# Patient Record
Sex: FEMALE | Race: WHITE | NOT HISPANIC OR LATINO | Employment: FULL TIME | URBAN - METROPOLITAN AREA
[De-identification: names, ages, dates, MRNs, and addresses within clinical notes are randomized per-mention and may not be internally consistent; named-entity substitution may affect disease eponyms.]

---

## 2017-05-25 ENCOUNTER — OFFICE VISIT (OUTPATIENT)
Dept: URGENT CARE | Facility: CLINIC | Age: 27
End: 2017-05-25
Payer: COMMERCIAL

## 2017-05-25 ENCOUNTER — APPOINTMENT (OUTPATIENT)
Dept: RADIOLOGY | Facility: IMAGING CENTER | Age: 27
End: 2017-05-25
Attending: PHYSICIAN ASSISTANT
Payer: COMMERCIAL

## 2017-05-25 VITALS
HEART RATE: 83 BPM | OXYGEN SATURATION: 99 % | RESPIRATION RATE: 18 BRPM | TEMPERATURE: 98.4 F | HEIGHT: 66 IN | WEIGHT: 140.2 LBS | BODY MASS INDEX: 22.53 KG/M2 | DIASTOLIC BLOOD PRESSURE: 82 MMHG | SYSTOLIC BLOOD PRESSURE: 124 MMHG

## 2017-05-25 DIAGNOSIS — R07.89 CHEST TIGHTNESS OR PRESSURE: ICD-10-CM

## 2017-05-25 DIAGNOSIS — F41.9 ANXIETY: ICD-10-CM

## 2017-05-25 PROCEDURE — 99204 OFFICE O/P NEW MOD 45 MIN: CPT | Performed by: PHYSICIAN ASSISTANT

## 2017-05-25 PROCEDURE — 71020 DX-CHEST-2 VIEWS: CPT | Mod: TC | Performed by: PHYSICIAN ASSISTANT

## 2017-05-25 PROCEDURE — 93000 ELECTROCARDIOGRAM COMPLETE: CPT | Performed by: PHYSICIAN ASSISTANT

## 2017-05-25 RX ORDER — NORGESTIMATE AND ETHINYL ESTRADIOL 0.25-0.035
1 KIT ORAL DAILY
Status: ON HOLD | COMMUNITY
End: 2021-09-28

## 2017-05-25 RX ORDER — ALBUTEROL SULFATE 90 UG/1
2 AEROSOL, METERED RESPIRATORY (INHALATION) EVERY 6 HOURS PRN
Qty: 8.5 G | Refills: 0 | Status: SHIPPED | OUTPATIENT
Start: 2017-05-25 | End: 2018-12-12

## 2017-05-25 NOTE — PROGRESS NOTES
"Subjective:      Katja Edwards is a 26 y.o. female who presents with Other    Pt PMH, SocHx, SurgHx, FamHx, Drug allergies and medications reviewed with pt/EPIC.      Family history reviewed, it is not pertinent to this complaint.      HPI Comments: Patient presents with:  Other: x 2 months has been having a tight chest, R side pressure that is worse with activity, had asthma as a child, comes and goes     PT denies recent travel, smoking, LE swelling, hx or family hx of PE/DVT.  PT is on OCP.      Other  This is a new problem. The current episode started more than 1 month ago. The problem occurs intermittently. The problem has been waxing and waning. Associated symptoms include chest pain (chest tightness). Pertinent negatives include no congestion, coughing, diaphoresis, fever, myalgias, nausea, neck pain, rash or vomiting. Exacerbated by: stress. She has tried rest, position changes and NSAIDs for the symptoms. The treatment provided no relief.       Review of Systems   Constitutional: Negative for fever and diaphoresis.   HENT: Negative for congestion.    Respiratory: Positive for shortness of breath (at times usually while at work). Negative for cough.    Cardiovascular: Positive for chest pain (chest tightness). Negative for palpitations.   Gastrointestinal: Negative for nausea and vomiting.   Musculoskeletal: Negative for myalgias and neck pain.   Skin: Negative for rash.   Neurological: Negative for dizziness.   Psychiatric/Behavioral: Negative for depression and substance abuse. The patient is nervous/anxious and has insomnia.    All other systems reviewed and are negative.         Objective:     /82 mmHg  Pulse 83  Temp(Src) 36.9 °C (98.4 °F)  Resp 18  Ht 1.676 m (5' 6\")  Wt 63.594 kg (140 lb 3.2 oz)  BMI 22.64 kg/m2  SpO2 99%     Physical Exam   Constitutional: She is oriented to person, place, and time. She appears well-developed and well-nourished. No distress.   HENT:   Head: " Normocephalic.   Nose: Nose normal.   Mouth/Throat: Oropharynx is clear and moist.   Eyes: Conjunctivae and EOM are normal. Pupils are equal, round, and reactive to light.   Neck: Normal range of motion. Neck supple.   Cardiovascular: Normal rate, regular rhythm, normal heart sounds and intact distal pulses.    Pulmonary/Chest: Effort normal and breath sounds normal.   Abdominal: Soft. Bowel sounds are normal. She exhibits no mass. There is no tenderness.   Musculoskeletal: Normal range of motion.   Neurological: She is alert and oriented to person, place, and time.   Skin: Skin is warm and dry.   Psychiatric: She has a normal mood and affect. Her speech is normal and behavior is normal. Thought content normal. Cognition and memory are normal.   Appears mildly anxious at times, though generally relaxed.    Nursing note and vitals reviewed.              FINDINGS:  The heart is normal in size.  No pulmonary infiltrates or consolidations are noted.  No pleural effusions are appreciated.  No evidence of pneumothorax or rib fracture.         Impression        No active disease.         Reading Provider Reading Date     Johann Thomas M.D. May 25, 2017         Signing Provider Signing Date Signing Time     Johann Thomas M.D. May 25, 2017 11:32 AM     EKG Interpretation   Interpreted by me   Rhythm: normal sinus   Rate: normal   Axis: normal   Ectopy: none   Conduction: normal   ST Segments: no acute change   T Waves: no acute change   Q Waves: none   Clinical Impression: no acute changes and normal EKG    Assessment/Plan:   Wells criteria (0 points for this patient) gives pt very minimal risk for PE.  I discussed this with the patient, she agrees and declined bloodwork/CT.     I doubt cardiac etiology of this chest pressure with normal EKG , lack of cardiac history and young age.  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  Pt states she has  noticed onset of today's symptoms shortly after beginning this new job.  PT denies feeling overly stressed but does acknowledge that this is a possibility.      We discussed keeping a journal, relaxation strategies, etc.      PT should follow up with PCP in 1-2 days for re-evaluation if symptoms have not improved.  Discussed red flags and reasons to return to UC or ED.  Pt and/or family verbalized understanding of diagnosis and follow up instructions and was given informational handout on diagnosis.  PT discharged.     Pt did request an albuterol inhaler , she has a very remote hx of exercise induced asthma and wondered if this might help her sob/tightness.  Rx provided.

## 2017-05-25 NOTE — PATIENT INSTRUCTIONS
Chest Pain, Nonspecific  It is often hard to give a specific diagnosis for the cause of chest pain. There is always a chance that your pain could be related to something serious, like a heart attack or a blood clot in the lungs. You need to follow up with your caregiver for further evaluation. More lab tests or other studies such as X-rays, electrocardiography, stress testing, or cardiac imaging may be needed to find the cause of your pain.  Most of the time, nonspecific chest pain improves within 2 to 3 days with rest and mild pain medicine. For the next few days, avoid physical exertion or activities that bring on pain. Do not smoke. Avoid drinking alcohol. Call your caregiver for routine follow-up as advised.   SEEK IMMEDIATE MEDICAL CARE IF:  · You develop increased chest pain or pain that radiates to the arm, neck, jaw, back, or abdomen.   · You develop shortness of breath, increased coughing, or you start coughing up blood.   · You have severe back or abdominal pain, nausea, or vomiting.   · You develop severe weakness, fainting, fever, or chills.   Document Released: 12/18/2006 Document Revised: 03/11/2013 Document Reviewed: 06/06/2008  [employer]® Patient Information ©2013 [employer].

## 2017-05-25 NOTE — Clinical Note
May 25, 2017         Patient: Katja Edwards   YOB: 1990   Date of Visit: 5/25/2017           To Whom it May Concern:    Katja Edwards was seen in my clinic on 5/25/2017. She may return to work on 05/26/2017.    If you have any questions or concerns, please don't hesitate to call.        Sincerely,           Darlin Guevara PA-C  Electronically Signed

## 2017-05-25 NOTE — MR AVS SNAPSHOT
"Katja Edwards   2017 10:45 AM   Office Visit   MRN: [id number]    Department:  [facility]   Dept Phone:  [phone]    Description:  Female : 1990   Provider:  Darlin Guevara PA-C           Reason for Visit     Other x 2 months has been having a tight chest, R side pressure that is worth with activity, had asthma as a child, comes and goes       Allergies as of 2017     Allergen Noted Reactions    Keflex 2017   Hives    Full body rash and hives     Pcn [Penicillins] 2017   Unspecified    As a baby       You were diagnosed with     Chest tightness or pressure   [785240]         Vital Signs     Blood Pressure Pulse Temperature Respirations Height Weight    124/82 mmHg 83 36.9 °C (98.4 °F) 18 1.676 m (5' 6\") 63.594 kg (140 lb 3.2 oz)    Body Mass Index Oxygen Saturation                22.64 kg/m2 99%          Basic Information     Date Of Birth Sex Race Ethnicity Preferred Language    1990 Female White Non- English      Health Maintenance     Patient has no pending health maintenance at this time      Current Immunizations     No immunizations on file.      Below and/or attached are the medications your provider expects you to take. Review all of your home medications and newly ordered medications with your provider and/or pharmacist. Follow medication instructions as directed by your provider and/or pharmacist. Please keep your medication list with you and share with your provider. Update the information when medications are discontinued, doses are changed, or new medications (including over-the-counter products) are added; and carry medication information at all times in the event of emergency situations     Allergies:  KEFLEX - Hives     PCN - Unspecified               Medications  Valid as of: May 25, 2017 - 11:58 AM    Generic Name Brand Name Tablet Size Instructions for use    Albuterol Sulfate (Aero Soln) albuterol 108 (90 BASE) MCG/ACT Inhale 2 " Puffs by mouth every 6 hours as needed for Shortness of Breath.        Norgestimate-Eth Estradiol (Tab) ORTHO-CYCLEN 0.25-35 MG-MCG Take 1 Tab by mouth every day.        .                 Medicines prescribed today were sent to:     [id number] - [place] - [address]    [address] [place] [address]    Phone: [phone] Fax: [phone]    Open 24 Hours?: No      Medication refill instructions:       If your prescription bottle indicates you have medication refills left, it is not necessary to call your provider’s office. Please contact your pharmacy and they will refill your medication.    If your prescription bottle indicates you do not have any refills left, you may request refills at any time through one of the following ways: The online [employer] system (except Urgent Care), by calling your provider’s office, or by asking your pharmacy to contact your provider’s office with a refill request. Medication refills are processed only during regular business hours and may not be available until the next business day. Your provider may request additional information or to have a follow-up visit with you prior to refilling your medication.   *Please Note: Medication refills are assigned a new Rx number when refilled electronically. Your pharmacy may indicate that no refills were authorized even though a new prescription for the same medication is available at the pharmacy. Please request the medicine by name with the pharmacy before contacting your provider for a refill.        Your To Do List     Future Labs/Procedures Complete By Expires    DX-CHEST-2 VIEWS  As directed 5/25/2018      Instructions    Chest Pain, Nonspecific  It is often hard to give a specific diagnosis for the cause of chest pain. There is always a chance that your pain could be related to something serious, like a heart attack or a blood clot in the lungs. You need to follow up with your caregiver for further evaluation. More lab tests or  other studies such as X-rays, electrocardiography, stress testing, or cardiac imaging may be needed to find the cause of your pain.  Most of the time, nonspecific chest pain improves within 2 to 3 days with rest and mild pain medicine. For the next few days, avoid physical exertion or activities that bring on pain. Do not smoke. Avoid drinking alcohol. Call your caregiver for routine follow-up as advised.   SEEK IMMEDIATE MEDICAL CARE IF:  · You develop increased chest pain or pain that radiates to the arm, neck, jaw, back, or abdomen.   · You develop shortness of breath, increased coughing, or you start coughing up blood.   · You have severe back or abdominal pain, nausea, or vomiting.   · You develop severe weakness, fainting, fever, or chills.   Document Released: 12/18/2006 Document Revised: 03/11/2013 Document Reviewed: 06/06/2008  e(ye)BRAINCare® Patient Information ©2013 [employer].          [employer] Access Code: [email or web address]  Expires: 6/24/2017 11:58 AM    [employer]  A secure, online tool to manage your health information     [employer]s [employer]® is a secure, online tool that connects you to your personalized health information from the privacy of your home -- day or night - making it very easy for you to manage your healthcare. Once the activation process is completed, you can even access your medical information using the [employer] akanksha, which is available for free in the Apple Akanksha store or Google Play store.     [employer] provides the following levels of access (as shown below):   My Chart Features   [facility]  Specialists [facility]  Urgent  Care Non-[facility]  Primary Care  Doctor   Email your healthcare team securely and privately 24/7 X X X    Manage appointments: schedule your next appointment; view details of past/upcoming appointments X      Request prescription refills. X      View recent personal medical records, including lab and immunizations X X X X   View health record,  including health history, allergies, medications X X X X   Read reports about your outpatient visits, procedures, consult and ER notes X X X X   See your discharge summary, which is a recap of your hospital and/or ER visit that includes your diagnosis, lab results, and care plan. X X       How to register for [employer]:  1. Go to  [email or web address].  2. Click on the Sign Up Now box, which takes you to the New Member Sign Up page. You will need to provide the following information:  a. Enter your [employer] Access Code exactly as it appears at the top of this page. (You will not need to use this code after you’ve completed the sign-up process. If you do not sign up before the expiration date, you must request a new code.)   b. Enter your date of birth.   c. Enter your home email address.   d. Click Submit, and follow the next screen’s instructions.  3. Create a [employer] ID. This will be your [employer] login ID and cannot be changed, so think of one that is secure and easy to remember.  4. Create a [employer] password. You can change your password at any time.  5. Enter your Password Reset Question and Answer. This can be used at a later time if you forget your password.   6. Enter your e-mail address. This allows you to receive e-mail notifications when new information is available in [employer].  7. Click Sign Up. You can now view your health information.    For assistance activating your [employer] account, call [phone]

## 2017-05-26 ASSESSMENT — ENCOUNTER SYMPTOMS
NERVOUS/ANXIOUS: 1
DIZZINESS: 0
VOMITING: 0
COUGH: 0
INSOMNIA: 1
SHORTNESS OF BREATH: 1
PALPITATIONS: 0
NECK PAIN: 0
DEPRESSION: 0
DIAPHORESIS: 0
FEVER: 0
NAUSEA: 0
MYALGIAS: 0

## 2017-05-26 ASSESSMENT — LIFESTYLE VARIABLES: SUBSTANCE_ABUSE: 0

## 2018-02-06 ENCOUNTER — OFFICE VISIT (OUTPATIENT)
Dept: URGENT CARE | Facility: CLINIC | Age: 28
End: 2018-02-06
Payer: COMMERCIAL

## 2018-02-06 VITALS
OXYGEN SATURATION: 96 % | TEMPERATURE: 99.9 F | BODY MASS INDEX: 22.18 KG/M2 | WEIGHT: 138 LBS | RESPIRATION RATE: 20 BRPM | HEIGHT: 66 IN | SYSTOLIC BLOOD PRESSURE: 110 MMHG | DIASTOLIC BLOOD PRESSURE: 70 MMHG | HEART RATE: 100 BPM

## 2018-02-06 DIAGNOSIS — R68.89 FLU-LIKE SYMPTOMS: ICD-10-CM

## 2018-02-06 DIAGNOSIS — Z20.828 EXPOSURE TO THE FLU: ICD-10-CM

## 2018-02-06 LAB
FLUAV+FLUBV AG SPEC QL IA: NORMAL
INT CON NEG: NEGATIVE
INT CON POS: POSITIVE

## 2018-02-06 PROCEDURE — 87804 INFLUENZA ASSAY W/OPTIC: CPT | Performed by: NURSE PRACTITIONER

## 2018-02-06 PROCEDURE — 99214 OFFICE O/P EST MOD 30 MIN: CPT | Performed by: NURSE PRACTITIONER

## 2018-02-06 RX ORDER — OSELTAMIVIR PHOSPHATE 75 MG/1
75 CAPSULE ORAL 2 TIMES DAILY
Qty: 10 CAP | Refills: 0 | Status: SHIPPED | OUTPATIENT
Start: 2018-02-06 | End: 2018-12-12

## 2018-02-06 ASSESSMENT — ENCOUNTER SYMPTOMS
SORE THROAT: 1
COUGH: 1
FEVER: 0

## 2018-02-06 ASSESSMENT — PATIENT HEALTH QUESTIONNAIRE - PHQ9: CLINICAL INTERPRETATION OF PHQ2 SCORE: 0

## 2018-02-07 NOTE — PROGRESS NOTES
"Subjective:      Katja Edwards is a 27 y.o. female who presents with Headache (Since yesterday fever and headache)            This is a new problem. Pt admits she was with a friend all weekend that tested positive for the flu yesterday morning. Pt reports onset of flu like symptoms that began last night. She is worried she may be coming down with the flu. Denies fever. + ST and cough.        Review of Systems   Constitutional: Negative for fever.   HENT: Positive for sore throat.    Respiratory: Positive for cough.    All other systems reviewed and are negative.    No past medical history on file. No past surgical history on file.   Social History     Social History   • Marital status: Single     Spouse name: N/A   • Number of children: N/A   • Years of education: N/A     Occupational History   • Not on file.     Social History Main Topics   • Smoking status: Never Smoker   • Smokeless tobacco: Never Used   • Alcohol use Not on file   • Drug use: Unknown   • Sexual activity: Not on file     Other Topics Concern   • Not on file     Social History Narrative   • No narrative on file          Objective:     /70   Pulse 100   Temp 37.7 °C (99.9 °F)   Resp 20   Ht 1.676 m (5' 6\")   Wt 62.6 kg (138 lb)   SpO2 96%   BMI 22.27 kg/m²      Physical Exam   Constitutional: She is oriented to person, place, and time. Vital signs are normal. She appears well-developed and well-nourished.   HENT:   Head: Normocephalic and atraumatic.   Right Ear: Tympanic membrane and external ear normal.   Left Ear: Tympanic membrane and external ear normal.   Nose: Nose normal.   Mouth/Throat: Posterior oropharyngeal erythema present.   Eyes: EOM are normal. Pupils are equal, round, and reactive to light.   Neck: Normal range of motion.   Cardiovascular: Normal rate and regular rhythm.    Pulmonary/Chest: Effort normal and breath sounds normal.   Musculoskeletal: Normal range of motion.   Lymphadenopathy:        Head (right side): " Submandibular adenopathy present.        Head (left side): Submandibular adenopathy present.   Neurological: She is alert and oriented to person, place, and time.   Skin: Skin is warm and dry. Capillary refill takes less than 2 seconds.   Psychiatric: She has a normal mood and affect. Her speech is normal and behavior is normal. Thought content normal.   Vitals reviewed.              Assessment/Plan:     1. Flu-like symptoms  - POCT Influenza A/B NEGATIVE  - oseltamivir (TAMIFLU) 75 MG Cap; Take 1 Cap by mouth 2 times a day.  Dispense: 10 Cap; Refill: 0    2. Exposure to the flu  - oseltamivir (TAMIFLU) 75 MG Cap; Take 1 Cap by mouth 2 times a day.  Dispense: 10 Cap; Refill: 0    Discussed with patient remains likely viral at this time, may be the flu  Will give contingent antiviral Rx  Increase water intake  Plenty of rest   Tylenol and Ibuprofen PRN pain/aches  Supportive care, differential diagnoses, and indications for immediate follow-up discussed with patient.    Pathogenesis of diagnosis discussed including typical length and natural progression.      Instructed to return to  or nearest emergency department if symptoms fail to improve, for any change in condition, further concerns, or new concerning symptoms.  Patient states understanding of the plan of care and discharge instructions.

## 2018-10-23 ENCOUNTER — OFFICE VISIT (OUTPATIENT)
Dept: URGENT CARE | Facility: PHYSICIAN GROUP | Age: 28
End: 2018-10-23
Payer: COMMERCIAL

## 2018-10-23 ENCOUNTER — HOSPITAL ENCOUNTER (OUTPATIENT)
Dept: RADIOLOGY | Facility: MEDICAL CENTER | Age: 28
End: 2018-10-23
Attending: PHYSICIAN ASSISTANT
Payer: COMMERCIAL

## 2018-10-23 VITALS
TEMPERATURE: 97.6 F | SYSTOLIC BLOOD PRESSURE: 112 MMHG | DIASTOLIC BLOOD PRESSURE: 68 MMHG | WEIGHT: 142 LBS | BODY MASS INDEX: 22.92 KG/M2 | HEART RATE: 62 BPM | OXYGEN SATURATION: 98 %

## 2018-10-23 DIAGNOSIS — S96.912A MUSCLE STRAIN OF LEFT FOOT, INITIAL ENCOUNTER: ICD-10-CM

## 2018-10-23 DIAGNOSIS — M77.42 METATARSALGIA OF LEFT FOOT: ICD-10-CM

## 2018-10-23 PROCEDURE — 99213 OFFICE O/P EST LOW 20 MIN: CPT | Performed by: PHYSICIAN ASSISTANT

## 2018-10-23 PROCEDURE — 73630 X-RAY EXAM OF FOOT: CPT | Mod: LT

## 2018-10-23 ASSESSMENT — ENCOUNTER SYMPTOMS
VOMITING: 0
SHORTNESS OF BREATH: 0
SENSORY CHANGE: 0
FEVER: 0
CHILLS: 0
NAUSEA: 0
TINGLING: 0

## 2018-10-23 NOTE — PROGRESS NOTES
"Subjective:   Katja Edwards is a 27 y.o. female who presents for Foot Pain (x 8 days, left foot)        Other   This is a new problem. The current episode started in the past 7 days. Pertinent negatives include no chills, fever, nausea, rash or vomiting.     Patient complains of pain beneath the ball of left foot.  She states pain is both the bottom as well as top of foot.  She denies traumatic injury she states she awoke with this discomfort last Sunday morning.  She denies trauma or injury prior.  She states she did walk her dog the day before but does not recall injury.  She denies past medical history of surgery or similar pains to this foot.  She states she wears \"business flats\" while at work denies regular use of high heeled shoes.  She is tried nothing for treatment complains of mild pain with weightbearing.    Review of Systems   Constitutional: Negative for chills and fever.   Respiratory: Negative for shortness of breath.    Gastrointestinal: Negative for nausea and vomiting.   Musculoskeletal: Positive for joint pain ( pos for left foot pain).   Skin: Negative for rash.   Neurological: Negative for tingling and sensory change.     Allergies   Allergen Reactions   • Keflex Hives     Full body rash and hives    • Pcn [Penicillins] Unspecified     As a baby       Objective:   /68 (BP Location: Right arm, Patient Position: Sitting)   Pulse 62   Temp 36.4 °C (97.6 °F) (Temporal)   Wt 64.4 kg (142 lb)   LMP 10/21/2018 (Exact Date)   SpO2 98%   BMI 22.92 kg/m²   Physical Exam   Constitutional: She is oriented to person, place, and time. She appears well-developed and well-nourished. No distress.   HENT:   Head: Normocephalic and atraumatic.   Right Ear: External ear normal.   Left Ear: External ear normal.   Nose: Nose normal.   Eyes: Conjunctivae and lids are normal. Right eye exhibits no discharge. Left eye exhibits no discharge. No scleral icterus.   Neck: Neck supple.   Pulmonary/Chest: Effort " normal. No respiratory distress.   Musculoskeletal: Normal range of motion.        Left foot: There is tenderness.        Feet:    Mild tenderness to palpation elicited in the primarily pain in the plantar aspect of second through fourth metatarsal heads no focal tenderness to palpation difficult to discern interdigital versus bony pain   Neurological: She is alert and oriented to person, place, and time. She has normal strength. She is not disoriented. No sensory deficit. Coordination and gait normal.   Skin: Skin is warm and dry. She is not diaphoretic. No erythema. No pallor.   Psychiatric: Her speech is normal and behavior is normal.   Nursing note and vitals reviewed.        Assessment/Plan:   Assessment    1. Muscle strain of left foot, initial encounter  - DX-FOOT-COMPLETE 3+ LEFT; Future    2. Metatarsalgia of left foot  - REFERRAL TO PODIATRY  Recommend conservative care, rest, ice, elevation, supportive shoes, work on gentle ROM exercises, sent w/ handout on metatarsal pads  over-the-counter anti-inflammatories follow-up with podiatry with lack of resolution  Return to clinic with lack of resolution or progression of symptoms.    Differential diagnosis, natural history, supportive care, and indications for immediate follow-up discussed.

## 2018-12-12 ENCOUNTER — OFFICE VISIT (OUTPATIENT)
Dept: URGENT CARE | Facility: PHYSICIAN GROUP | Age: 28
End: 2018-12-12
Payer: COMMERCIAL

## 2018-12-12 VITALS
WEIGHT: 142 LBS | DIASTOLIC BLOOD PRESSURE: 68 MMHG | HEART RATE: 71 BPM | HEIGHT: 66 IN | TEMPERATURE: 97.9 F | RESPIRATION RATE: 12 BRPM | OXYGEN SATURATION: 98 % | SYSTOLIC BLOOD PRESSURE: 108 MMHG | BODY MASS INDEX: 22.82 KG/M2

## 2018-12-12 DIAGNOSIS — J22 ACUTE LOWER RESPIRATORY TRACT INFECTION: ICD-10-CM

## 2018-12-12 DIAGNOSIS — R05.9 COUGH: ICD-10-CM

## 2018-12-12 DIAGNOSIS — J02.9 SORE THROAT: ICD-10-CM

## 2018-12-12 PROCEDURE — 99214 OFFICE O/P EST MOD 30 MIN: CPT | Performed by: NURSE PRACTITIONER

## 2018-12-12 RX ORDER — AZITHROMYCIN 250 MG/1
TABLET, FILM COATED ORAL
Qty: 6 TAB | Refills: 0 | Status: ON HOLD | OUTPATIENT
Start: 2018-12-12 | End: 2021-09-28

## 2018-12-12 RX ORDER — CODEINE PHOSPHATE AND GUAIFENESIN 10; 100 MG/5ML; MG/5ML
5 SOLUTION ORAL
Qty: 120 ML | Refills: 0 | Status: SHIPPED | OUTPATIENT
Start: 2018-12-12 | End: 2019-01-01

## 2018-12-13 NOTE — NON-PROVIDER
Chief Complaint   Patient presents with   • Cough     Congestion, chest congestion  [id number]       HISTORY OF PRESENT ILLNESS: Patient is a 27 y.o. female who presents to urgent care today for complaints of chest congestion and cough that started 6 weeks ago. She states that her symptoms started initially 10/29 with a sore throat. She went to [facility] where she was tested for strep that was negative. She continues to have a sore throat off and on but thinks its due to her frequent coughing. Her chest congestion and cough have continued, she has tired OTC cough medications and Mucinex without relief of her symptoms. She has not had any antibiotics for her symptoms. Her cough is productive, yellow in color, and does keep her awake at night. Recent sick contacts include her immunocompromised nephew who has similar symptoms who's pediatrician believes it to be viral. Denies fever, body aches, ear pain, shortness of breath, wheezing, chest pain, nausea, vomiting, diarrhea or abdominal pain. Reports she had asthma as a child but no issues with asthma in 18 years, does not use an inhaler.       There are no active problems to display for this patient.      Allergies:Keflex and Pcn [penicillins]    Current Outpatient Prescriptions Ordered in [facility]   Medication Sig Dispense Refill   • azithromycin (ZITHROMAX) 250 MG Tab Take two tabs on day one followed by one tab on days 2-5. 6 Tab 0   • guaifenesin-codeine (ROBITUSSIN AC) Solution oral solution Take 5 mL by mouth at bedtime as needed for up to 20 days. Do not drive, work, drink alcohol or engaged in potentially hazardous activity while taking this medication. 120 mL 0   • norgestimate-ethinyl estradiol (ORTHO-CYCLEN) 0.25-35 MG-MCG per tablet Take 1 Tab by mouth every day.       No current [facility]-ordered facility-administered medications on file.        History reviewed. No pertinent past medical history.    Social History   Substance Use Topics   • Smoking status: Never  "Smoker   • Smokeless tobacco: Never Used   • Alcohol use Yes      Comment: occ       No family status information on file.   History reviewed. No pertinent family history.    ROS:  Review of Systems   Constitutional: Negative for fever, chills, weight loss, malaise, and fatigue.   HENT: Negative for ear pain, nosebleeds, nasal congestion, and neck pain. Positive for sore throat.   Eyes: Negative for vision changes.   Neuro: Negative for headache, sensory changes, weakness, seizure, LOC.   Cardiovascular: Negative for chest pain, palpitations, orthopnea and leg swelling.   Respiratory: Negative for shortness of breath and wheezing.  Positive for cough, sputum production, chest congestion.   Gastrointestinal: Negative for abdominal pain, nausea, vomiting or diarrhea.   Musculoskeletal: Negative for falls, neck pain, back pain, joint pain, myalgias.   Skin: Negative for rash, diaphoresis.     Exam:  Blood pressure 108/68, pulse 71, temperature 36.6 °C (97.9 °F), temperature source Temporal, resp. rate 12, height 1.676 m (5' 6\"), weight 64.4 kg (142 lb), SpO2 98 %, not currently breastfeeding.     General: well-nourished, well-developed female in NAD  Head: normocephalic, atraumatic  Eyes: PERRLA, no conjunctival injection, acuity grossly intact, lids normal.  Ears: normal shape and symmetry, no tenderness, no discharge. External canals are without any significant edema or erythema. Tympanic membranes are without any inflammation, no effusion. Gross auditory acuity is intact.  Nose: symmetrical without tenderness, no discharge.  Mouth/Throat: reasonable hygiene, no exudates or tonsillar enlargement. Erythema to posterior pharynx.  Neck: no masses, range of motion within normal limits, no tracheal deviation. No obvious thyroid enlargement.   Lymph: no cervical adenopathy. No supraclavicular adenopathy.   Neuro: alert and oriented. Cranial nerves 2-12 grossly intact. No sensory deficit.   Cardiovascular: regular rate and " rhythm. No edema.  Pulmonary: no distress. Chest is symmetrical with respiration, no wheezes, crackles, or rhonchi. Clear to auscultation in all lung fields.   Abdomen: soft, non-tender, no guarding, no hepatosplenomegaly.  Musculoskeletal: no clubbing, appropriate muscle tone, gait is stable.  Skin: warm, dry, intact, no clubbing, no cyanosis, no rashes.   Psych: appropriate mood, affect, judgement.         Assessment/Plan:  1. Acute lower respiratory tract infection  azithromycin (ZITHROMAX) 250 MG Tab   2. Sore throat     3. Cough  guaifenesin-codeine (ROBITUSSIN AC) Solution oral solution       Given patient's symptoms have continued for 6 weeks without improvement and has not tried antibiotics, patient in agreement with taking antibiotics. Discussed that if her symptoms do not improve in next 48-72 hours, or if she develops fever, chills, increased throat pain, nausea, or vomiting to return to clinic and perform additional tests including chest xray and repeat strep. Patient is in agreement with plan. She is non-toxic appearing. Robitussin AC sedative effects discussed and to take at night for cough.     Supportive care, differential diagnoses, and indications for immediate follow-up discussed with patient.   Pathogenesis of diagnosis discussed including typical length and natural progression.   Instructed to return to clinic or nearest emergency department for any change in condition, further concerns, or worsening of symptoms.  Patient states understanding of the plan of care and discharge instructions. Patient is discharged in stable condition.   Instructed to make an appointment, for follow up, with her primary care provider.      Daniella Pritchard RN, BSN, A.P.R.N. student   .

## 2018-12-13 NOTE — PROGRESS NOTES
Chart reviewed. I personally have evaluated patient and agree with FRANCISCA student, Daniella Pritchard's, evaluation, assessment and treatment plan. I have personally treated the patient and prescribed the medication needed for treatment today.       FRANCISCA Goldman

## 2020-02-24 ENCOUNTER — OFFICE VISIT (OUTPATIENT)
Dept: URGENT CARE | Facility: CLINIC | Age: 30
End: 2020-02-24
Payer: COMMERCIAL

## 2020-02-24 VITALS
DIASTOLIC BLOOD PRESSURE: 70 MMHG | TEMPERATURE: 98.4 F | BODY MASS INDEX: 24.91 KG/M2 | HEIGHT: 66 IN | WEIGHT: 155 LBS | OXYGEN SATURATION: 99 % | HEART RATE: 68 BPM | SYSTOLIC BLOOD PRESSURE: 112 MMHG | RESPIRATION RATE: 16 BRPM

## 2020-02-24 DIAGNOSIS — Z20.828 EXPOSURE TO INFLUENZA: ICD-10-CM

## 2020-02-24 LAB
FLUAV+FLUBV AG SPEC QL IA: NEGATIVE
INT CON NEG: NORMAL
INT CON POS: NORMAL

## 2020-02-24 PROCEDURE — 99214 OFFICE O/P EST MOD 30 MIN: CPT | Performed by: FAMILY MEDICINE

## 2020-02-24 PROCEDURE — 87804 INFLUENZA ASSAY W/OPTIC: CPT | Performed by: FAMILY MEDICINE

## 2020-02-24 RX ORDER — OSELTAMIVIR PHOSPHATE 75 MG/1
75 CAPSULE ORAL 2 TIMES DAILY
Qty: 10 CAP | Refills: 0 | Status: ON HOLD | OUTPATIENT
Start: 2020-02-24 | End: 2021-09-28

## 2020-02-24 ASSESSMENT — ENCOUNTER SYMPTOMS
SORE THROAT: 1
SINUS PAIN: 0
COUGH: 1
RHINORRHEA: 1

## 2020-02-24 NOTE — PATIENT INSTRUCTIONS

## 2020-02-24 NOTE — PROGRESS NOTES
"Subjective:   Katja Edwards  is a 29 y.o. female who presents for Nasal Congestion (Flu expossure, today stuffy  nose .)        URI    This is a new problem. The current episode started today. The problem has been gradually worsening. There has been no fever. Associated symptoms include congestion, coughing, rhinorrhea and a sore throat. Pertinent negatives include no sinus pain.     Review of Systems   HENT: Positive for congestion, rhinorrhea and sore throat. Negative for sinus pain.    Respiratory: Positive for cough.      Allergies   Allergen Reactions   • Keflex Hives     Full body rash and hives    • Pcn [Penicillins] Unspecified     As a baby       Objective:   /70   Pulse 68   Temp 36.9 °C (98.4 °F) (Temporal)   Resp 16   Ht 1.676 m (5' 6\")   Wt 70.3 kg (155 lb)   SpO2 99%   BMI 25.02 kg/m²   Physical Exam  Constitutional:       General: She is not in acute distress.     Appearance: She is well-developed.   HENT:      Head: Normocephalic and atraumatic.      Mouth/Throat:      Pharynx: Uvula midline. Posterior oropharyngeal erythema present.      Tonsils: No tonsillar abscesses.   Eyes:      Conjunctiva/sclera: Conjunctivae normal.      Pupils: Pupils are equal, round, and reactive to light.   Cardiovascular:      Rate and Rhythm: Normal rate and regular rhythm.      Heart sounds: No murmur.   Pulmonary:      Effort: Pulmonary effort is normal. No respiratory distress.      Breath sounds: Normal breath sounds.   Abdominal:      General: There is no distension.      Palpations: Abdomen is soft.      Tenderness: There is no abdominal tenderness.   Skin:     General: Skin is warm and dry.   Neurological:      Mental Status: She is alert and oriented to person, place, and time.      Sensory: No sensory deficit.      Deep Tendon Reflexes: Reflexes are normal and symmetric.           Assessment/Plan:   1. Exposure to influenza  - oseltamivir (TAMIFLU) 75 MG Cap; Take 1 Cap by mouth 2 times a day.  " Dispense: 10 Cap; Refill: 0    Differential diagnosis, natural history, supportive care, and indications for immediate follow-up discussed.

## 2021-03-04 LAB
ABO GROUP BLD: NORMAL
HBV SURFACE AG SERPL QL IA: NEGATIVE
HIV 1+2 AB+HIV1 P24 AG SERPL QL IA: NONREACTIVE
RH BLD: NORMAL
RUBV IGG SERPL IA-ACNC: NORMAL
TREPONEMA PALLIDUM IGG+IGM AB [PRESENCE] IN SERUM OR PLASMA BY IMMUNOASSAY: NON REACTIVE

## 2021-09-02 LAB — GP B STREP DNA SPEC QL NAA+PROBE: NEGATIVE

## 2021-09-26 ENCOUNTER — HOSPITAL ENCOUNTER (EMERGENCY)
Facility: MEDICAL CENTER | Age: 31
End: 2021-09-26
Attending: OBSTETRICS & GYNECOLOGY | Admitting: OBSTETRICS & GYNECOLOGY
Payer: COMMERCIAL

## 2021-09-26 VITALS
HEART RATE: 76 BPM | RESPIRATION RATE: 18 BRPM | SYSTOLIC BLOOD PRESSURE: 120 MMHG | WEIGHT: 180 LBS | OXYGEN SATURATION: 94 % | HEIGHT: 66 IN | DIASTOLIC BLOOD PRESSURE: 78 MMHG | BODY MASS INDEX: 28.93 KG/M2 | TEMPERATURE: 97.9 F

## 2021-09-26 VITALS
WEIGHT: 180 LBS | DIASTOLIC BLOOD PRESSURE: 67 MMHG | SYSTOLIC BLOOD PRESSURE: 113 MMHG | OXYGEN SATURATION: 94 % | HEIGHT: 66 IN | TEMPERATURE: 97.1 F | BODY MASS INDEX: 28.93 KG/M2 | HEART RATE: 70 BPM

## 2021-09-26 PROCEDURE — 302449 STATCHG TRIAGE ONLY (STATISTIC)

## 2021-09-26 PROCEDURE — A9270 NON-COVERED ITEM OR SERVICE: HCPCS | Performed by: OBSTETRICS & GYNECOLOGY

## 2021-09-26 PROCEDURE — 59025 FETAL NON-STRESS TEST: CPT

## 2021-09-26 PROCEDURE — 700102 HCHG RX REV CODE 250 W/ 637 OVERRIDE(OP): Performed by: OBSTETRICS & GYNECOLOGY

## 2021-09-26 RX ORDER — ZOLPIDEM TARTRATE 5 MG/1
5 TABLET ORAL ONCE
Status: COMPLETED | OUTPATIENT
Start: 2021-09-26 | End: 2021-09-26

## 2021-09-26 RX ADMIN — ZOLPIDEM TARTRATE 5 MG: 5 TABLET ORAL at 20:21

## 2021-09-26 NOTE — PROGRESS NOTES
PT is a ; RACHEL of ; making her 39w3d. Pt here c/o painful Ucs that started yesterday and have continued through this morning about every 2-7 minutes. Denies LOF, VB and reports +FM. EFM and TOCO applied. VSS.   Reactive NST obtained. SVE at /-2.     SVE one hour later with no cervical change noted. Dr Valadez notified. Okay to offer pt Ambien if desired.     General discharge instructions, labor precautions and when to return discussed with pt and FOB. Pt feels comfortable discharging home. PT and FOB state they live 15 minutes or so away and are knowledgeable when to return. Pt encouraged to call/return with concerns/questions. Pt signed discharge instructions and ambulated out in stable condition with FOB at side.

## 2021-09-27 ENCOUNTER — ANESTHESIA (OUTPATIENT)
Dept: ANESTHESIOLOGY | Facility: MEDICAL CENTER | Age: 31
End: 2021-09-27
Payer: COMMERCIAL

## 2021-09-27 ENCOUNTER — ANESTHESIA EVENT (OUTPATIENT)
Dept: ANESTHESIOLOGY | Facility: MEDICAL CENTER | Age: 31
End: 2021-09-27
Payer: COMMERCIAL

## 2021-09-27 ENCOUNTER — HOSPITAL ENCOUNTER (INPATIENT)
Facility: MEDICAL CENTER | Age: 31
LOS: 1 days | End: 2021-09-28
Attending: OBSTETRICS & GYNECOLOGY | Admitting: OBSTETRICS & GYNECOLOGY
Payer: COMMERCIAL

## 2021-09-27 LAB
BASOPHILS # BLD AUTO: 0.3 % (ref 0–1.8)
BASOPHILS # BLD: 0.04 K/UL (ref 0–0.12)
EOSINOPHIL # BLD AUTO: 0.02 K/UL (ref 0–0.51)
EOSINOPHIL NFR BLD: 0.1 % (ref 0–6.9)
ERYTHROCYTE [DISTWIDTH] IN BLOOD BY AUTOMATED COUNT: 46.7 FL (ref 35.9–50)
HCT VFR BLD AUTO: 39.2 % (ref 37–47)
HGB BLD-MCNC: 13.5 G/DL (ref 12–16)
HOLDING TUBE BB 8507: NORMAL
IMM GRANULOCYTES # BLD AUTO: 0.07 K/UL (ref 0–0.11)
IMM GRANULOCYTES NFR BLD AUTO: 0.4 % (ref 0–0.9)
LYMPHOCYTES # BLD AUTO: 1.42 K/UL (ref 1–4.8)
LYMPHOCYTES NFR BLD: 9 % (ref 22–41)
MCH RBC QN AUTO: 31 PG (ref 27–33)
MCHC RBC AUTO-ENTMCNC: 34.4 G/DL (ref 33.6–35)
MCV RBC AUTO: 90.1 FL (ref 81.4–97.8)
MONOCYTES # BLD AUTO: 1.09 K/UL (ref 0–0.85)
MONOCYTES NFR BLD AUTO: 6.9 % (ref 0–13.4)
NEUTROPHILS # BLD AUTO: 13.07 K/UL (ref 2–7.15)
NEUTROPHILS NFR BLD: 83.3 % (ref 44–72)
NRBC # BLD AUTO: 0 K/UL
NRBC BLD-RTO: 0 /100 WBC
PLATELET # BLD AUTO: 283 K/UL (ref 164–446)
PMV BLD AUTO: 9.5 FL (ref 9–12.9)
RBC # BLD AUTO: 4.35 M/UL (ref 4.2–5.4)
SARS-COV+SARS-COV-2 AG RESP QL IA.RAPID: NOTDETECTED
SPECIMEN SOURCE: NORMAL
WBC # BLD AUTO: 15.7 K/UL (ref 4.8–10.8)

## 2021-09-27 PROCEDURE — 770002 HCHG ROOM/CARE - OB PRIVATE (112)

## 2021-09-27 PROCEDURE — 700105 HCHG RX REV CODE 258: Performed by: OBSTETRICS & GYNECOLOGY

## 2021-09-27 PROCEDURE — 87426 SARSCOV CORONAVIRUS AG IA: CPT

## 2021-09-27 PROCEDURE — 0HQ9XZZ REPAIR PERINEUM SKIN, EXTERNAL APPROACH: ICD-10-PCS | Performed by: OBSTETRICS & GYNECOLOGY

## 2021-09-27 PROCEDURE — A9270 NON-COVERED ITEM OR SERVICE: HCPCS | Performed by: OBSTETRICS & GYNECOLOGY

## 2021-09-27 PROCEDURE — 700102 HCHG RX REV CODE 250 W/ 637 OVERRIDE(OP): Performed by: OBSTETRICS & GYNECOLOGY

## 2021-09-27 PROCEDURE — 700111 HCHG RX REV CODE 636 W/ 250 OVERRIDE (IP)

## 2021-09-27 PROCEDURE — 85025 COMPLETE CBC W/AUTO DIFF WBC: CPT

## 2021-09-27 PROCEDURE — 700111 HCHG RX REV CODE 636 W/ 250 OVERRIDE (IP): Performed by: ANESTHESIOLOGY

## 2021-09-27 PROCEDURE — 10907ZC DRAINAGE OF AMNIOTIC FLUID, THERAPEUTIC FROM PRODUCTS OF CONCEPTION, VIA NATURAL OR ARTIFICIAL OPENING: ICD-10-PCS | Performed by: OBSTETRICS & GYNECOLOGY

## 2021-09-27 PROCEDURE — 59409 OBSTETRICAL CARE: CPT

## 2021-09-27 PROCEDURE — 700111 HCHG RX REV CODE 636 W/ 250 OVERRIDE (IP): Performed by: OBSTETRICS & GYNECOLOGY

## 2021-09-27 PROCEDURE — 303615 HCHG EPIDURAL/SPINAL ANESTHESIA FOR LABOR

## 2021-09-27 PROCEDURE — 304965 HCHG RECOVERY SERVICES

## 2021-09-27 PROCEDURE — 36415 COLL VENOUS BLD VENIPUNCTURE: CPT

## 2021-09-27 RX ORDER — SODIUM CHLORIDE, SODIUM LACTATE, POTASSIUM CHLORIDE, AND CALCIUM CHLORIDE .6; .31; .03; .02 G/100ML; G/100ML; G/100ML; G/100ML
250 INJECTION, SOLUTION INTRAVENOUS PRN
Status: DISCONTINUED | OUTPATIENT
Start: 2021-09-27 | End: 2021-09-27 | Stop reason: HOSPADM

## 2021-09-27 RX ORDER — BUPIVACAINE HYDROCHLORIDE 2.5 MG/ML
INJECTION, SOLUTION EPIDURAL; INFILTRATION; INTRACAUDAL
Status: COMPLETED
Start: 2021-09-27 | End: 2021-09-27

## 2021-09-27 RX ORDER — MISOPROSTOL 200 UG/1
600 TABLET ORAL
Status: DISCONTINUED | OUTPATIENT
Start: 2021-09-27 | End: 2021-09-28 | Stop reason: HOSPADM

## 2021-09-27 RX ORDER — ROPIVACAINE HYDROCHLORIDE 2 MG/ML
INJECTION, SOLUTION EPIDURAL; INFILTRATION; PERINEURAL CONTINUOUS
Status: DISCONTINUED | OUTPATIENT
Start: 2021-09-27 | End: 2021-09-28 | Stop reason: HOSPADM

## 2021-09-27 RX ORDER — BUPIVACAINE HYDROCHLORIDE 2.5 MG/ML
INJECTION, SOLUTION EPIDURAL; INFILTRATION; INTRACAUDAL PRN
Status: DISCONTINUED | OUTPATIENT
Start: 2021-09-27 | End: 2021-09-27 | Stop reason: SURG

## 2021-09-27 RX ORDER — METHYLERGONOVINE MALEATE 0.2 MG/ML
0.2 INJECTION INTRAVENOUS
Status: DISCONTINUED | OUTPATIENT
Start: 2021-09-27 | End: 2021-09-28 | Stop reason: HOSPADM

## 2021-09-27 RX ORDER — OXYCODONE HYDROCHLORIDE AND ACETAMINOPHEN 5; 325 MG/1; MG/1
2 TABLET ORAL EVERY 4 HOURS PRN
Status: DISCONTINUED | OUTPATIENT
Start: 2021-09-27 | End: 2021-09-28 | Stop reason: HOSPADM

## 2021-09-27 RX ORDER — ONDANSETRON 4 MG/1
4 TABLET, ORALLY DISINTEGRATING ORAL EVERY 6 HOURS PRN
Status: DISCONTINUED | OUTPATIENT
Start: 2021-09-27 | End: 2021-09-28 | Stop reason: HOSPADM

## 2021-09-27 RX ORDER — ONDANSETRON 2 MG/ML
4 INJECTION INTRAMUSCULAR; INTRAVENOUS EVERY 6 HOURS PRN
Status: DISCONTINUED | OUTPATIENT
Start: 2021-09-27 | End: 2021-09-28 | Stop reason: HOSPADM

## 2021-09-27 RX ORDER — DOCUSATE SODIUM 100 MG/1
100 CAPSULE, LIQUID FILLED ORAL 2 TIMES DAILY PRN
Status: DISCONTINUED | OUTPATIENT
Start: 2021-09-27 | End: 2021-09-28 | Stop reason: HOSPADM

## 2021-09-27 RX ORDER — SODIUM CHLORIDE, SODIUM LACTATE, POTASSIUM CHLORIDE, AND CALCIUM CHLORIDE .6; .31; .03; .02 G/100ML; G/100ML; G/100ML; G/100ML
1000 INJECTION, SOLUTION INTRAVENOUS
Status: DISCONTINUED | OUTPATIENT
Start: 2021-09-27 | End: 2021-09-27 | Stop reason: HOSPADM

## 2021-09-27 RX ORDER — CARBOPROST TROMETHAMINE 250 UG/ML
250 INJECTION, SOLUTION INTRAMUSCULAR
Status: DISCONTINUED | OUTPATIENT
Start: 2021-09-27 | End: 2021-09-28 | Stop reason: HOSPADM

## 2021-09-27 RX ORDER — MISOPROSTOL 200 UG/1
800 TABLET ORAL
Status: DISCONTINUED | OUTPATIENT
Start: 2021-09-27 | End: 2021-09-27 | Stop reason: HOSPADM

## 2021-09-27 RX ORDER — SODIUM CHLORIDE, SODIUM LACTATE, POTASSIUM CHLORIDE, CALCIUM CHLORIDE 600; 310; 30; 20 MG/100ML; MG/100ML; MG/100ML; MG/100ML
INJECTION, SOLUTION INTRAVENOUS CONTINUOUS
Status: ACTIVE | OUTPATIENT
Start: 2021-09-27 | End: 2021-09-27

## 2021-09-27 RX ORDER — SODIUM CHLORIDE, SODIUM LACTATE, POTASSIUM CHLORIDE, CALCIUM CHLORIDE 600; 310; 30; 20 MG/100ML; MG/100ML; MG/100ML; MG/100ML
INJECTION, SOLUTION INTRAVENOUS PRN
Status: DISCONTINUED | OUTPATIENT
Start: 2021-09-27 | End: 2021-09-28 | Stop reason: HOSPADM

## 2021-09-27 RX ORDER — IBUPROFEN 600 MG/1
600 TABLET ORAL EVERY 6 HOURS PRN
Status: DISCONTINUED | OUTPATIENT
Start: 2021-09-27 | End: 2021-09-28 | Stop reason: HOSPADM

## 2021-09-27 RX ORDER — OXYCODONE HYDROCHLORIDE AND ACETAMINOPHEN 5; 325 MG/1; MG/1
1 TABLET ORAL EVERY 4 HOURS PRN
Status: DISCONTINUED | OUTPATIENT
Start: 2021-09-27 | End: 2021-09-28 | Stop reason: HOSPADM

## 2021-09-27 RX ADMIN — ROPIVACAINE HYDROCHLORIDE: 2 INJECTION, SOLUTION EPIDURAL; INFILTRATION at 18:54

## 2021-09-27 RX ADMIN — OXYTOCIN 1 MILLI-UNITS/MIN: 10 INJECTION, SOLUTION INTRAMUSCULAR; INTRAVENOUS at 17:15

## 2021-09-27 RX ADMIN — OXYTOCIN 125 ML/HR: 10 INJECTION, SOLUTION INTRAMUSCULAR; INTRAVENOUS at 21:15

## 2021-09-27 RX ADMIN — ROPIVACAINE HYDROCHLORIDE: 2 INJECTION, SOLUTION EPIDURAL; INFILTRATION at 10:02

## 2021-09-27 RX ADMIN — BUPIVACAINE HYDROCHLORIDE 2.5 ML: 2.5 INJECTION, SOLUTION EPIDURAL; INFILTRATION; INTRACAUDAL; PERINEURAL at 09:57

## 2021-09-27 RX ADMIN — FENTANYL CITRATE 50 MCG: 50 INJECTION, SOLUTION INTRAMUSCULAR; INTRAVENOUS at 10:02

## 2021-09-27 RX ADMIN — SODIUM CHLORIDE, POTASSIUM CHLORIDE, SODIUM LACTATE AND CALCIUM CHLORIDE: 600; 310; 30; 20 INJECTION, SOLUTION INTRAVENOUS at 10:10

## 2021-09-27 RX ADMIN — FENTANYL CITRATE 50 MCG: 50 INJECTION, SOLUTION INTRAMUSCULAR; INTRAVENOUS at 09:57

## 2021-09-27 RX ADMIN — BUPIVACAINE HYDROCHLORIDE 2.5 ML: 2.5 INJECTION, SOLUTION EPIDURAL; INFILTRATION; INTRACAUDAL; PERINEURAL at 10:02

## 2021-09-27 RX ADMIN — IBUPROFEN 600 MG: 600 TABLET ORAL at 23:49

## 2021-09-27 ASSESSMENT — PAIN DESCRIPTION - PAIN TYPE
TYPE: ACUTE PAIN
TYPE: ACUTE PAIN

## 2021-09-27 ASSESSMENT — LIFESTYLE VARIABLES: EVER_SMOKED: NEVER

## 2021-09-27 ASSESSMENT — PATIENT HEALTH QUESTIONNAIRE - PHQ9
1. LITTLE INTEREST OR PLEASURE IN DOING THINGS: NOT AT ALL
SUM OF ALL RESPONSES TO PHQ9 QUESTIONS 1 AND 2: 0
2. FEELING DOWN, DEPRESSED, IRRITABLE, OR HOPELESS: NOT AT ALL

## 2021-09-27 NOTE — ANESTHESIA PROCEDURE NOTES
Epidural Block    Date/Time: 9/27/2021 9:50 AM  Performed by: Honorio Cobos M.D.  Authorized by: Honorio Cobos M.D.     Patient Location:  OB  Start Time:  9/27/2021 9:50 AM  End Time:  9/27/2021 9:57 AM  Reason for Block: labor analgesia    patient identified, IV checked, site marked, risks and benefits discussed, surgical consent, monitors and equipment checked, pre-op evaluation and timeout performed    Patient Position:  Sitting  Prep: ChloraPrep, patient draped and sterile technique    Monitoring:  Blood pressure, continuous pulse oximetry and heart rate  Approach:  Midline  Location:  L3-L4  Injection Technique:  IVAN saline  Skin infiltration:  Lidocaine  Strength:  1%  Dose:  3ml  Needle Type:  Tuohy  Needle Gauge:  17 G  Needle Length:  3.5 in  Loss of resistance::  5  Catheter Size:  19 G  Catheter at Skin Depth:  10  Test Dose Result:  Negative   Success on 1st pass  Patient has mild scoliosis, so insertion point was skewed left of midline  No heme/CSF  Catheter threaded easily  Negative aspiration  No evidence of complications  Patient comfortable after loading dose

## 2021-09-27 NOTE — PROGRESS NOTES
Late Entry    1330-Pt care assumed.  1555-SVE=9/100/+1.  Pt placed on peanut ball, far LS.  1650-Dr. Davis called in for update on pt.  Most recent exam reported, will recheck.  1655-SVE=no change.  1715-Oxytocin started at 1 milliunit/min.  Dr. Davis at BS.  1720-BS report given to Temo RN-pt care assumed

## 2021-09-27 NOTE — ANESTHESIA PREPROCEDURE EVALUATION
deleon pregnancy at 39+5 weeks gestation. No significant medical history or pregnancy complications.    Relevant Problems   No relevant active problems       Physical Exam    Airway   Mallampati: II  TM distance: >3 FB  Neck ROM: full       Cardiovascular - normal exam  Rhythm: regular  Rate: normal  (-) murmur     Dental - normal exam           Pulmonary - normal exam  Breath sounds clear to auscultation     Abdominal    Neurological - normal exam                 Anesthesia Plan    ASA 2       Plan - epidural   Neuraxial block will be labor analgesia                  Pertinent diagnostic labs and testing reviewed    Informed Consent:    Anesthetic plan and risks discussed with patient.

## 2021-09-27 NOTE — PROGRESS NOTES
0840- 31 y/o, , EDC 21, EGA 39.5. Here to room 222 with  August. C/o UCs Q 2-4 min. EFM/toco applied, patient denies lof/bleeding, reports positive fm. VSS. SVE as noted. Dr. Davis called with updates. Admission orders received.     0950- Dr. Cobos to bedside. Epidural placed without complications. Pt having intermittent variable decelerations.     1040- Dr. Davis called with updates. Discussed FHT with MD. Orders for terbutaline if variables do not resolve with position changes.     9270- Report to KARIS Orellana RN. POC discussed.

## 2021-09-27 NOTE — PROGRESS NOTES
Labor Progress Note    Katja Edwards   39w4d      Subjective:  Pt comfortable with epidural.  Uterine contractions:yes  Pain: No    Objective:   Vitals:    09/27/21 1140 09/27/21 1155 09/27/21 1210 09/27/21 1230   BP: (!) 99/64 (!) 99/63 (!) 96/63 118/77   Pulse: 81 77 84 79   Resp:       Temp:       TempSrc:       SpO2:       Weight:       Height:         Fetal heart variability: 150's category 1, rare variables  Aucilla: hard to monitor  SVE: 9/c/0, arom, clear    Membranes ruptured: .yes, clear    Meds:   Epidural : .yes  Pitocin: .no    Labs:  Recent Results (from the past 24 hour(s))   Hold Blood Bank Specimen (Not Tested)    Collection Time: 09/27/21  9:20 AM   Result Value Ref Range    Holding Tube - Bb DONE    CBC WITH DIFFERENTIAL    Collection Time: 09/27/21  9:20 AM   Result Value Ref Range    WBC 15.7 (H) 4.8 - 10.8 K/uL    RBC 4.35 4.20 - 5.40 M/uL    Hemoglobin 13.5 12.0 - 16.0 g/dL    Hematocrit 39.2 37.0 - 47.0 %    MCV 90.1 81.4 - 97.8 fL    MCH 31.0 27.0 - 33.0 pg    MCHC 34.4 33.6 - 35.0 g/dL    RDW 46.7 35.9 - 50.0 fL    Platelet Count 283 164 - 446 K/uL    MPV 9.5 9.0 - 12.9 fL    Neutrophils-Polys 83.30 (H) 44.00 - 72.00 %    Lymphocytes 9.00 (L) 22.00 - 41.00 %    Monocytes 6.90 0.00 - 13.40 %    Eosinophils 0.10 0.00 - 6.90 %    Basophils 0.30 0.00 - 1.80 %    Immature Granulocytes 0.40 0.00 - 0.90 %    Nucleated RBC 0.00 /100 WBC    Neutrophils (Absolute) 13.07 (H) 2.00 - 7.15 K/uL    Lymphs (Absolute) 1.42 1.00 - 4.80 K/uL    Monos (Absolute) 1.09 (H) 0.00 - 0.85 K/uL    Eos (Absolute) 0.02 0.00 - 0.51 K/uL    Baso (Absolute) 0.04 0.00 - 0.12 K/uL    Immature Granulocytes (abs) 0.07 0.00 - 0.11 K/uL    NRBC (Absolute) 0.00 K/uL   SARS-COV Antigen TATE: Collect dry nasal swab    Collection Time: 09/27/21  9:25 AM   Result Value Ref Range    SARS-CoV-2 Source Nasal Swab     SARS-COV ANTIGEN TATE NotDetected Not-Detected     ABO ^ O    Rh ^ +    Antibody screen    HbA1c    Chlamydia by PCR     Gonorrhea by PCR    RPR/Syphilus ^ Non reactive    HSV 1/2 by PCR (non-serum)    HSV 1/2 (serum)    HSV 1    HSV 2    HPV (16)    HBsAg ^ Negative    HIV-1 HIV-2 Antibodies ^ Nonreactive    Rubella ^ Immune        Hgb  13.5 g/dL   Hct  39.2 %   Platelet count  283 K/uL   GBS (MENDOZA BROTH) ^ Negative    Fasting Glucose Tolerance    GTT, 1 hour    GTT, 2 hous    GTT, 3hours        Assessment:   39w4d/active labor-pt progressing, cpm. Expt .  GBBS-negative  S/p COVID infection-managed as oupt, now recovered.        Sylwia Davis M.D.

## 2021-09-28 ENCOUNTER — PHARMACY VISIT (OUTPATIENT)
Dept: PHARMACY | Facility: MEDICAL CENTER | Age: 31
End: 2021-09-28
Payer: MEDICARE

## 2021-09-28 VITALS
DIASTOLIC BLOOD PRESSURE: 69 MMHG | RESPIRATION RATE: 16 BRPM | SYSTOLIC BLOOD PRESSURE: 116 MMHG | HEART RATE: 62 BPM | WEIGHT: 180 LBS | TEMPERATURE: 97.7 F | OXYGEN SATURATION: 97 % | HEIGHT: 66 IN | BODY MASS INDEX: 28.93 KG/M2

## 2021-09-28 LAB
ERYTHROCYTE [DISTWIDTH] IN BLOOD BY AUTOMATED COUNT: 49 FL (ref 35.9–50)
HCT VFR BLD AUTO: 37.8 % (ref 37–47)
HGB BLD-MCNC: 13 G/DL (ref 12–16)
MCH RBC QN AUTO: 31.8 PG (ref 27–33)
MCHC RBC AUTO-ENTMCNC: 34.4 G/DL (ref 33.6–35)
MCV RBC AUTO: 92.4 FL (ref 81.4–97.8)
PLATELET # BLD AUTO: 227 K/UL (ref 164–446)
PMV BLD AUTO: 9.4 FL (ref 9–12.9)
RBC # BLD AUTO: 4.09 M/UL (ref 4.2–5.4)
WBC # BLD AUTO: 18.3 K/UL (ref 4.8–10.8)

## 2021-09-28 PROCEDURE — RXMED WILLOW AMBULATORY MEDICATION CHARGE: Performed by: OBSTETRICS & GYNECOLOGY

## 2021-09-28 PROCEDURE — 90471 IMMUNIZATION ADMIN: CPT

## 2021-09-28 PROCEDURE — 90686 IIV4 VACC NO PRSV 0.5 ML IM: CPT | Performed by: OBSTETRICS & GYNECOLOGY

## 2021-09-28 PROCEDURE — 3E02340 INTRODUCTION OF INFLUENZA VACCINE INTO MUSCLE, PERCUTANEOUS APPROACH: ICD-10-PCS | Performed by: OBSTETRICS & GYNECOLOGY

## 2021-09-28 PROCEDURE — A9270 NON-COVERED ITEM OR SERVICE: HCPCS | Performed by: OBSTETRICS & GYNECOLOGY

## 2021-09-28 PROCEDURE — 700111 HCHG RX REV CODE 636 W/ 250 OVERRIDE (IP): Performed by: OBSTETRICS & GYNECOLOGY

## 2021-09-28 PROCEDURE — 36415 COLL VENOUS BLD VENIPUNCTURE: CPT

## 2021-09-28 PROCEDURE — 700102 HCHG RX REV CODE 250 W/ 637 OVERRIDE(OP): Performed by: OBSTETRICS & GYNECOLOGY

## 2021-09-28 PROCEDURE — 85027 COMPLETE CBC AUTOMATED: CPT

## 2021-09-28 RX ORDER — IBUPROFEN 600 MG/1
600 TABLET ORAL EVERY 6 HOURS PRN
Qty: 60 TABLET | Refills: 1 | Status: SHIPPED | OUTPATIENT
Start: 2021-09-28

## 2021-09-28 RX ADMIN — INFLUENZA A VIRUS A/VICTORIA/2570/2019 IVR-215 (H1N1) ANTIGEN (FORMALDEHYDE INACTIVATED), INFLUENZA A VIRUS A/TASMANIA/503/2020 IVR-221 (H3N2) ANTIGEN (FORMALDEHYDE INACTIVATED), INFLUENZA B VIRUS B/PHUKET/3073/2013 ANTIGEN (FORMALDEHYDE INACTIVATED), AND INFLUENZA B VIRUS B/WASHINGTON/02/2019 ANTIGEN (FORMALDEHYDE INACTIVATED) 0.5 ML: 15; 15; 15; 15 INJECTION, SUSPENSION INTRAMUSCULAR at 09:06

## 2021-09-28 RX ADMIN — IBUPROFEN 600 MG: 600 TABLET ORAL at 13:35

## 2021-09-28 ASSESSMENT — EDINBURGH POSTNATAL DEPRESSION SCALE (EPDS)
I HAVE BEEN SO UNHAPPY THAT I HAVE HAD DIFFICULTY SLEEPING: NOT AT ALL
I HAVE BEEN SO UNHAPPY THAT I HAVE BEEN CRYING: NO, NEVER
I HAVE BLAMED MYSELF UNNECESSARILY WHEN THINGS WENT WRONG: YES, SOME OF THE TIME
I HAVE LOOKED FORWARD WITH ENJOYMENT TO THINGS: AS MUCH AS I EVER DID
I HAVE BEEN ANXIOUS OR WORRIED FOR NO GOOD REASON: NO, NOT AT ALL
THINGS HAVE BEEN GETTING ON TOP OF ME: NO, I HAVE BEEN COPING AS WELL AS EVER
I HAVE FELT SAD OR MISERABLE: NO, NOT AT ALL
I HAVE BEEN ABLE TO LAUGH AND SEE THE FUNNY SIDE OF THINGS: AS MUCH AS I ALWAYS COULD
I HAVE FELT SCARED OR PANICKY FOR NO GOOD REASON: NO, NOT AT ALL
THE THOUGHT OF HARMING MYSELF HAS OCCURRED TO ME: NEVER

## 2021-09-28 ASSESSMENT — PAIN DESCRIPTION - PAIN TYPE
TYPE: ACUTE PAIN
TYPE: ACUTE PAIN

## 2021-09-28 ASSESSMENT — PAIN SCALES - GENERAL: PAIN_LEVEL: 1

## 2021-09-28 NOTE — PROGRESS NOTES
1900 bedside report received from Theresa ERVIN. Dr. Davis at bedside, SVE Complete +1, ok to start pushing    1915 pushing started    2011 Dr. Davis called to bedside for delivery     2026 viable baby boy born via Dr. Davis, thick meconium noted at delivery. Baby placed skin to skin w/ mom, delayed cord clamping x2 minutes. Orders received to send cord gasses.     2036 placenta delivered spontaneously intact.     2236 pt ambulated to toilet to void, epidural catheter removed.     2309 pt transferred to PPU w/ baby in arms. Bedside report given to Beth ERVIN. Care assumed. Bands verified by both RNs

## 2021-09-28 NOTE — PROGRESS NOTES
Assumed care of patient at change of shift. Discussed POC with patient.     1500- Discussed discharge education and follow up for patient and infant with patient. Demonstrated circ care for patient. Patient verbalizes understanding.

## 2021-09-28 NOTE — DISCHARGE SUMMARY
Discharge Summary:      Katja Edwards    Admit Date:   2021  Discharge Date:  2021     Admitting diagnosis:  Labor and delivery indication for care or intervention [O75.9]  Discharge Diagnosis: Status post vaginal, spontaneous.  Pregnancy Complications: h/o COVID infection during pregnancy  Tubal Ligation:  no        History:  No past medical history on file.  OB History    Para Term  AB Living   1 1 1     1   SAB TAB Ectopic Molar Multiple Live Births           0 1      # Outcome Date GA Lbr Nolberto/2nd Weight Sex Delivery Anes PTL Lv   1 Term 21 39w4d / 01:36 3.37 kg (7 lb 6.9 oz) M Vag-Spont EPI N ACE        Keflex and Pcn [penicillins]  There are no problems to display for this patient.       Hospital Course:   30 y.o. , now para 1, was admitted with the above mentioned diagnosis, underwent Active Labor, pt then progressed to a vaginal, spontaneous delivery. Patient postpartum course was unremarkable, with progressive advancement in diet , ambulation and toleration of oral analgesia. Patient without complaints today and desires discharge.      Vitals:    21 2200 21 2300 21 0200 21 0600   BP: 130/70 111/74 111/75 118/80   Pulse: 85 80 67 60   Resp:  18 17 17   Temp:  37 °C (98.6 °F) 36.8 °C (98.3 °F) 36.3 °C (97.3 °F)   TempSrc:  Temporal Temporal Temporal   SpO2:  92% 94% 98%   Weight:       Height:           Current Facility-Administered Medications   Medication Dose   • Influenza Vaccine Quad pf (STATE SUPPLIED) injection 0.5 mL  0.5 mL   • ondansetron (ZOFRAN ODT) dispertab 4 mg  4 mg    Or   • ondansetron (ZOFRAN) syringe/vial injection 4 mg  4 mg   • oxytocin (PITOCIN) infusion (for postpartum)  2,000 mL/hr    Followed by   • oxytocin (PITOCIN) infusion (for postpartum)   mL/hr   • ibuprofen (MOTRIN) tablet 600 mg  600 mg   • oxyCODONE-acetaminophen (PERCOCET) 5-325 MG per tablet 1 Tablet  1 Tablet   • oxyCODONE-acetaminophen (PERCOCET)  5-325 MG per tablet 2 Tablet  2 Tablet   • ropivacaine 0.2 % (NAROPIN) injection     • ropivacaine 0.2 % (NAROPIN) injection     • oxytocin (PITOCIN) 20 UNITS/1000ML LR (induction of labor)  0.5-20 jeffry-units/min   • LR infusion     • PRN oxytocin (PITOCIN) (20 Units/1000 mL) PRN for excessive uterine bleeding - See Admin Instr  125-999 mL/hr   • miSOPROStol (CYTOTEC) tablet 600 mcg  600 mcg   • methylergonovine (METHERGINE) injection 0.2 mg  0.2 mg   • carboPROST (HEMABATE) injection 250 mcg  250 mcg   • docusate sodium (COLACE) capsule 100 mg  100 mg       Exam:  Gen: NAD  Breast Exam: negative  Abdomen: Abdomen soft, non-tender. BS normal. No masses,  No organomegaly  Fundus Non Tender: no  Extremity: extremities, peripheral pulses and reflexes normal, no edema, redness or tenderness in the calves or thighs     Labs:  Recent Labs     09/27/21  0920 09/28/21  0535   WBC 15.7* 18.3*   RBC 4.35 4.09*   HEMOGLOBIN 13.5 13.0   HEMATOCRIT 39.2 37.8   MCV 90.1 92.4   MCH 31.0 31.8   MCHC 34.4 34.4   RDW 46.7 49.0   PLATELETCT 283 227   MPV 9.5 9.4        Activity:   Discharge to home  Pelvic Rest x 6 weeks    Assessment:  normal postpartum course  Discharge Assessment: No areas of skin breakdown/redness     Follow up: 6 weeks with Dr Davis     Discharge Meds:   No current outpatient medications on file.   OTC ibuprofen, tylenol    Sylwia Davis M.D.

## 2021-09-28 NOTE — ANESTHESIA POSTPROCEDURE EVALUATION
Patient: Katja Edwards    Procedure Summary     Date: 09/27/21 Room / Location:     Anesthesia Start: 0950 Anesthesia Stop: 2026    Procedure: Labor Epidural Diagnosis:     Scheduled Providers:  Responsible Provider: Honorio Cobos M.D.    Anesthesia Type: epidural ASA Status: 2          Final Anesthesia Type: epidural  Last vitals  BP   Blood Pressure: 111/74    Temp   37 °C (98.6 °F)    Pulse   80   Resp   18    SpO2   92 %      Anesthesia Post Evaluation    Patient location during evaluation: floor  Patient participation: complete - patient participated  Level of consciousness: awake and alert  Pain score: 1    Airway patency: patent  Anesthetic complications: no  Cardiovascular status: hemodynamically stable  Respiratory status: acceptable  Hydration status: euvolemic    PONV: none          No complications documented.

## 2021-09-28 NOTE — L&D DELIVERY NOTE
DATE OF SERVICE:  2021     ADMITTING DIAGNOSES:  1.  Intrauterine pregnancy at 39 weeks and 4 days.  2.  Active labor.  3.  GBS negative.  4.  Status post COVID infection on 09/10/2021, now resolved.  Managed as an   outpatient.  5.  Fetal status is reassuring.     PROCEDURES:  1.  Admission to labor and delivery.  2.  Epidural.  3.  Pitocin up to 1 milliunit.  4.  Normal spontaneous vaginal delivery of a vigorous male with Apgars 8 and   9.     PROCEDURE:  This patient is a 30-year-old  1, para 0 that was admitted   at 39 weeks and 4 days in active labor.  When she arrived, she was 6 cm   dilated.  Fetal status was reassuring.  She was GBS negative; therefore, no   antibiotics were given.  COVID test upon admission was negative.  The patient   had an epidural for pain control.  She progressed through labor without any   problems.  She was 9 cm dilated at 01:23 in the afternoon.  At that time, she   had artificial rupture of membranes, which was clear.  When she was checked   again at 5:25 p.m., she was still 9 cm dilated, completely effaced and +1   station.  She was started on Pitocin at 1 milliunit and with 1 milliunit, she   progressed to complete, complete and +1 station, this was at 6:52 p.m.  At   that time, the Pitocin was discontinued.  Fetal status was reassuring and   category 2 strip.  The patient then started pushing and at 8:26 p.m., I   assisted with a normal spontaneous vaginal delivery of a vigorous male in LISBETH   position.  The head was delivered atraumatically.  A tight nuchal cord x1 was   reduced and the rest of the infant delivered without any problem.  There was   thick meconium.  Mouth and nose were bulb suctioned.  Infant placed on   maternal abdomen.  Delayed cord clamping was performed for 2 minutes and then   the cord was doubly clamped and cut by the infant's father.  Cord gases were   clamped and sent.  The placenta was then delivered intact at 8:36 p.m.    Three-vessel cord  was noted.  She did have a first-degree midline laceration   that was repaired with a 2-0 Vicryl and CT1 needle without any problems.  The   patient tolerated the procedure well.  Uterus is firm and hemostatic with an   EBL of 300.  Mom and baby are doing well.  Again, this was a vigorous male   with Apgars 8 and 9.  She was complete at 6:52 p.m., delivered at 8:26 p.m.   and placenta delivered at 8:36 p.m.  No antibiotics were given since the   patient was GBS negative.        ______________________________  MD KAITLIN MOYA/RAULITO/VIVI    DD:  09/27/2021 21:14  DT:  09/27/2021 21:34    Job#:  [id number]    CC:SARAH SADLER MD

## 2021-09-28 NOTE — CARE PLAN
The patient is Stable - Low risk of patient condition declining or worsening    Shift Goals  Clinical Goals: pain control    Progress made toward(s) clinical / shift goals:  Pt able to ambulate, care for self and infant. Pt states her pain is controlled with prn pain medication. Pt's pain reassessed throughout the shift.      Patient is not progressing towards the following goals:

## 2021-09-28 NOTE — H&P
DATE OF ADMISSION:  2021     CHIEF COMPLAINT:  Painful contractions since yesterday.     HISTORY OF PRESENT ILLNESS:  This patient is a 30-year-old  1, para 0   patient who is 39 weeks and 4 days based on last menstrual cycle of 2020   and ultrasound at 10 weeks.  The patient has had prenatal care with me.  She   has been co-managed by Dr. Thanh Kaur.  She had a negative NIPT test, negative   MSAFP test.  She had a negative first trimester ultrasound.  The fetus did   show fetal pyelectasis that resolved at followup ultrasounds.  Of note, the   patient did have acute COVID infection during this pregnancy that was   diagnosed on 09/10.  Her fiance was also positive.  She had minimal symptoms   and was treated as an outpatient.  Otherwise, the rest of the prenatal care   has been uncomplicated.  The patient came in today complaining of painful   contractions since yesterday.  She was here in labor and delivery twice   yesterday in early labor and was sent home.  When she arrived today, she was   already 6 cm dilated.  Fetal status was reassuring.  She has been admitted.    She has an epidural in place and she is now 9 cm and comfortable.  The patient   with good fetal movement.     PAST MEDICAL HISTORY:  1.  Active COVID infection on 09/10/2021, now resolved.  2.  History of HPV.     PAST SURGICAL HISTORY:  None.     MEDICATIONS:  She is on prenatal vitamins.     ALLERGIES:  PENICILLIN G CAUSES A RASH AND KEFLEX CAUSES A RASH.     OBSTETRICAL HISTORY:  She is a  1, para 0.     GYNECOLOGIC HISTORY:  The patient started menstruating at age 12, has   menstrual cycles every 28 days that last 5 days.  Her last menstrual cycle was   on 2020.  Her last Pap smear was on 2021, negative Pap.  Positive   HPV, but negative HPV 16 and 18, negative chlamydia, negative gonorrhea.     SOCIAL HISTORY:  The patient is engaged.  She denies a history of tobacco,   alcohol or drug use.     PHYSICAL  EXAMINATION:  VITAL SIGNS:  Stable.  The patient's blood pressure 108/57, heart rate is 85.  GENERAL:  Pleasant female in no acute distress, comfortable with an epidural.  LUNGS:  Clear to auscultation bilaterally.  CARDIOVASCULAR:  Regular rate and rhythm.  No murmur.  ABDOMEN:  Soft, gravid.  Leopold's to 8-1/2 pounds.  EXTREMITIES:  No calf tenderness.  PELVIC:  Fetal heart tones 130s, category 1.  Grill ann every 2   minutes.  Sterile vaginal exam, she is 9 cm dilated, completely effaced, and 0   station.     LABORATORY DATA:  The patient's GBS is negative.  One-hour glucose 112.  H and   H at 28 weeks was 13.5 and 40.3.  RPR was nonreactive.  NIPT test was   negative and MSAFP test was negative.  The patient's hepatitis C was negative.    The patient's cystic fibrosis, SMA and fragile X testing were negative.  The   patient's other prenatal labs, hepatitis B surface antigen is negative, RPR   nonreactive, rubella immune.  She is O positive.  Antibody screen negative,   HIV nonreactive.     DIAGNOSTIC DATA:  The patient's most current ultrasound by Dr. Sarah Kaur on   2021, fetus was measuring 36 weeks, cardiac activity 162, cephalic.    Placenta was posterior.  Three-vessel cord was noted.  Estimated fetal weight   at that time was 5 pounds 12 ounces, resolved fetal pyelectasis.     ASSESSMENT AND PLAN:  A 30-year-old  1, para 0 at 39 weeks and 4 days.  1.  Active labor.  The patient is progressing.  She is now 9 cm dilated.  She   is comfortable with an epidural.  We will expect a normal spontaneous vaginal   delivery.  2.  GBS negative.  3.  Status post COVID infection 09/10/2021, now resolved.  Most current COVID   test done today admission was negative.  4.  Fetal status reassuring.        ______________________________  MD KAITLIN MOYA/ZO/GEORGES    DD:  2021 18:11  DT:  2021 18:35    Job#:  [id number]    CC:SARAH KAUR MD

## 2021-09-28 NOTE — L&D DELIVERY NOTE
Delivery note    , male in LISBETH position with a tight nuchal cord with thick mec. Head delivered atraumatically and mouth and nose bulb suctioned. Infant placed on maternal abdomens. Delayed cord clamping x 2 mins and cord double clamped and cut by infant father. Cord gases clamped and set. Placenta delivered intact. 3vc.  1st degree midline laceration repaired with 2-0 vicryl on ct-1 needle. Pt tolerated the procedure. . Mom and baby are doing well.

## 2021-09-28 NOTE — PROGRESS NOTES
Labor Progress Note    Katja E Jerry   39w4d      Subjective:  Pt comfortable with epidural.  Uterine contractions:yes  Pain: no    Objective:   Vitals:    09/27/21 1355 09/27/21 1415 09/27/21 1500 09/27/21 1600   BP: 108/57 106/65     Pulse: 85 96     Resp:       Temp:   37.1 °C (98.8 °F) 37 °C (98.6 °F)   TempSrc:   Temporal Temporal   SpO2:       Weight:       Height:         FHT: 150's category 1   [place]: q 2-3  SVE: 9/C/+1 no change x 4.5 hours    Membranes ruptured: .yes    Meds:   Epidural : .yes  Pitocin: .no    Labs:  Recent Results (from the past 24 hour(s))   Hold Blood Bank Specimen (Not Tested)    Collection Time: 09/27/21  9:20 AM   Result Value Ref Range    Holding Tube - Bb DONE    CBC WITH DIFFERENTIAL    Collection Time: 09/27/21  9:20 AM   Result Value Ref Range    WBC 15.7 (H) 4.8 - 10.8 K/uL    RBC 4.35 4.20 - 5.40 M/uL    Hemoglobin 13.5 12.0 - 16.0 g/dL    Hematocrit 39.2 37.0 - 47.0 %    MCV 90.1 81.4 - 97.8 fL    MCH 31.0 27.0 - 33.0 pg    MCHC 34.4 33.6 - 35.0 g/dL    RDW 46.7 35.9 - 50.0 fL    Platelet Count 283 164 - 446 K/uL    MPV 9.5 9.0 - 12.9 fL    Neutrophils-Polys 83.30 (H) 44.00 - 72.00 %    Lymphocytes 9.00 (L) 22.00 - 41.00 %    Monocytes 6.90 0.00 - 13.40 %    Eosinophils 0.10 0.00 - 6.90 %    Basophils 0.30 0.00 - 1.80 %    Immature Granulocytes 0.40 0.00 - 0.90 %    Nucleated RBC 0.00 /100 WBC    Neutrophils (Absolute) 13.07 (H) 2.00 - 7.15 K/uL    Lymphs (Absolute) 1.42 1.00 - 4.80 K/uL    Monos (Absolute) 1.09 (H) 0.00 - 0.85 K/uL    Eos (Absolute) 0.02 0.00 - 0.51 K/uL    Baso (Absolute) 0.04 0.00 - 0.12 K/uL    Immature Granulocytes (abs) 0.07 0.00 - 0.11 K/uL    NRBC (Absolute) 0.00 K/uL   SARS-COV Antigen TATE: Collect dry nasal swab    Collection Time: 09/27/21  9:25 AM   Result Value Ref Range    SARS-CoV-2 Source Nasal Swab     SARS-COV ANTIGEN TATE NotDetected Not-Detected       Assessment:   39w4d/active labor-Pt with protracted labor, start pitocin and if no change  in 2 hours will proceed with primary c/s. Pt agrees with the plan. All questions answered.j   GBBS-negative  S/p COVID infection-managed as oupt, now recovered.  Fetal status-reassuring           Sylwia Davis M.D.

## 2021-09-28 NOTE — LACTATION NOTE
"This note was copied from a baby's chart.  Baby 39.4 weeks, . Baby latched in L&D, MOB reports baby has been latching deep. MOB has baby STS with pacifier in baby's mouth, educated mother on risks of pacifier use. LC assisted baby to right breast using cross cradle hold, baby latched deep with coordinated sucks then pulled off and MOB latched baby deep. MOB denies pain with latch, reinforced positioning baby nipple to nose & keeping baby level with breasts- see latch assessment score.     Teaching on hunger cues, breastfeeding when baby shows cues, breastfeeding a minimum 8 or more times in 24 hours no longer than 4 hours from last feed, importance of STS, positioning baby at breast & cluster feeding is normal behavior. Reviewed possibility of engorgement once milk comes in.     MOB has [employer] insurance, encouraged mother to F/U with [facility] for OP lactation support once home. Information sheets [facility] Resource, Breastfeeding Support/Zoom given. Encouraged mother to Watch [place] [facility] \"Hand Expression\" video, demo on hand expression done by LC.    Breastfeeding plan:  Breastfeed on cue a minimum 8 times or more in 24 hours no longer than 4 hours from last feed.   "

## 2021-09-28 NOTE — DISCHARGE INSTRUCTIONS
PATIENT DISCHARGE EDUCATION INSTRUCTION SHEET  REASONS TO CALL YOUR OBSTETRICIAN  · Persistent fever, shaking, chills (Temperature higher than 100.4) may indicate you have an infection  · Heavy bleeding: soaking more than 1 pad per hour; Passing clots an egg-sized clot or bigger may mean you have an postpartum hemorrhage  · Foul odor from vagina or bad smelling or discolored discharge or blood  · Breast infection (Mastitis symptoms); breast pain, chills, fever, redness or red streaks, may feel flu like symptoms  · Urinary pain, burning or frequency  · Incision that is not healing, increased redness, swelling, tenderness or pain, or any pus from episiotomy or  site may mean you have an infection  · Redness, swelling, warmth, or painful to touch in the calf area of your leg may mean you have a blood clot  · Severe or intensified depression, thoughts or feelings of wanting to hurt yourself or someone else   · Pain in chest, obstructed breathing or shortness of breath (trouble catching your breath) may mean you are having a postpartum complication. Call your provider immediately   · Headache that does not get better, even after taking medicine, a bad headache with vision changes or pain in the upper right area of your belly may mean you have high blood pressure or post birth preeclampsia. Call your provider immediately    HAND WASHING  All family and friends should wash their hands:  · Before and after holding the baby  · Before feeding the baby  · After using the restroom or changing the baby's diaper    WOUND CARE  Ask your physician for additional care instructions. In general:  ·  Incision:  · May shower and pat incision dry   · Keep the incision clean and dry  · There should not be any opening or pus from the incision  · Continue to walk at home 3 times a day   · Do NOT lift anything heavier than your baby (over 10 pounds)  · Encourage family to help participate in care of the  to allow  rest and mom time to heal  · Episiotomy/Laceration  · May use parveen-spray bottle, witch hazel pads and dermaplast spray for comfort  · Use parveen-spray bottle after urinating to cleanse perineal area  · To prevent burning during urination spray parveen-water bottle on labial area   · Pat perineal area dry until episiotomy/laceration is healed  · Continue to use parveen-bottle until bleeding stops as needed  · If have a 2nd degree laceration or greater, a Sitz bath can offer relief from soreness, burning, and inflammation   · Sitz Bath   · Sit in 6 inches of warm water and soak laceration as needed until the laceration heals    VAGINAL CARE AND BLEEDING  · Nothing inside vagina for 6 weeks:   · No sexual intercourse, tampons or douching  · Bleeding may continue for 2-4 weeks. Amount and color may vary  · Soaking 1 pad or more in an hour for several hours is considered heavy bleeding  · Passing large egg sized blood clots can be concerning  · If you feel like you have heavy bleeding or are having increasing amount of blood clots call your Obstetrician immediately  · If you begin feeling faint upon standing, feeling sick to your stomach, have clammy skin, a really fast heartbeat, have chills, start feeling confused, dizzy, sleepy or weak, or feeling like you're going to faint call your Obstetrician immediately    HYPERTENSION   Preeclampsia or gestational hypertension are types of high blood pressure that only pregnant women can get. It is important for you to be aware of symptoms to seek early intervention and treatment. If you have any of these symptoms immediately call your Obstetrician    · Vision changes or blurred vision   · Severe headache or pain that is unrelieved with medication and will not go away  · Persistent pain in upper abdomen or shoulder   · Increased swelling of face, feet, or hands  · Difficulty breathing or shortness of breath at rest  · Urinating less than usual    URINATION AND BOWEL MOVEMENTS  · Eating  "more fiber (bran cereal, fruits, and vegetables) and drinking plenty of fluids will help to avoid constipation  · Urinary frequency and urgency after childbirth is normal  · If you experience any urinary pain, burning or frequency call your provider    BIRTH CONTROL  · It is possible to become pregnant at any time after delivery and while breastfeeding  · Plan to discuss a method of birth control with your physician at your post delivery follow up visit    POSTPARTUM BLUES  During the first few days after birth, you may experience a sense of the \"blues\" which may include impatience, irritability or even crying. These feelings come and go quickly. However, as many as 1 in 10 women experience emotional symptoms known as postpartum depression.     POSTPARTUM DEPRESSION    May start as early as the second or third day after delivery or take several weeks or months to develop. Symptoms of \"blues\" are present, but are more intense: Crying spells; loss of appetite; feelings of hopelessness or loss of control; fear of touching the baby; over concern or no concern at all about the baby; little or no concern about your own appearance/caring for yourself; and/or inability to sleep or excessive sleeping. Contact your Obstetrician if you are experiencing any of these symptoms     PREVENTING SHAKEN BABY  If you are angry or stressed, PUT THE BABY IN THE CRIB, step away, take some deep breaths, and wait until you are calm to care for the baby. DO NOT SHAKE THE BABY. You are not alone, call a supporter for help.  · [facility] 24/7 crisis call line (662-678-5527) or (1-415.975.2473)  · You can also text them, text \"ANSWER\" (891002)      "

## 2021-09-28 NOTE — DISCHARGE PLANNING
Meds-to-Beds: Discharge prescription order listed below delivered to patient's bedside. RN notified. Patient counseled. Patient elected to have co-payment billed to patient account.      Current Outpatient Medications   Medication Sig Dispense Refill   • ibuprofen (MOTRIN) 600 MG Tab Take 1 Tablet by mouth every 6 hours as needed. 60 Tablet 1      Emma Causey, PharmD

## 2021-09-28 NOTE — PROGRESS NOTES
Labor Progress Note    Katja Edwards   39w4d      Subjective:  Pt is feeling pelvic pressure.  Uterine contractions:yes  Pain: No    Objective:   Vitals:    09/27/21 1800 09/27/21 1810 09/27/21 1825 09/27/21 1840   BP:  107/59 117/63 119/74   Pulse:  89 93 86   Resp:       Temp: 37.1 °C (98.8 °F)      TempSrc: Temporal      SpO2:       Weight:       Height:         Fetal heart variability:150's early decels with contraction, decreased LTV, positive scalp stim, cat II  [place]: q 2  SVE: c/c/+1  Membranes ruptured: .yes    Meds:   Epidural : .yes  Pitocin: .yes, 1 mu    Labs:  Recent Results (from the past 24 hour(s))   Hold Blood Bank Specimen (Not Tested)    Collection Time: 09/27/21  9:20 AM   Result Value Ref Range    Holding Tube - Bb DONE    CBC WITH DIFFERENTIAL    Collection Time: 09/27/21  9:20 AM   Result Value Ref Range    WBC 15.7 (H) 4.8 - 10.8 K/uL    RBC 4.35 4.20 - 5.40 M/uL    Hemoglobin 13.5 12.0 - 16.0 g/dL    Hematocrit 39.2 37.0 - 47.0 %    MCV 90.1 81.4 - 97.8 fL    MCH 31.0 27.0 - 33.0 pg    MCHC 34.4 33.6 - 35.0 g/dL    RDW 46.7 35.9 - 50.0 fL    Platelet Count 283 164 - 446 K/uL    MPV 9.5 9.0 - 12.9 fL    Neutrophils-Polys 83.30 (H) 44.00 - 72.00 %    Lymphocytes 9.00 (L) 22.00 - 41.00 %    Monocytes 6.90 0.00 - 13.40 %    Eosinophils 0.10 0.00 - 6.90 %    Basophils 0.30 0.00 - 1.80 %    Immature Granulocytes 0.40 0.00 - 0.90 %    Nucleated RBC 0.00 /100 WBC    Neutrophils (Absolute) 13.07 (H) 2.00 - 7.15 K/uL    Lymphs (Absolute) 1.42 1.00 - 4.80 K/uL    Monos (Absolute) 1.09 (H) 0.00 - 0.85 K/uL    Eos (Absolute) 0.02 0.00 - 0.51 K/uL    Baso (Absolute) 0.04 0.00 - 0.12 K/uL    Immature Granulocytes (abs) 0.07 0.00 - 0.11 K/uL    NRBC (Absolute) 0.00 K/uL   SARS-COV Antigen TATE: Collect dry nasal swab    Collection Time: 09/27/21  9:25 AM   Result Value Ref Range    SARS-CoV-2 Source Nasal Swab     SARS-COV ANTIGEN TATE NotDetected Not-Detected       Assessment:   39w4d/complete-push, expt  .  GBBS-negative  S/p COVID infection-managed as oupt, now recovered. COVID test today is negative  Fetal status-reassuring      Sylwia Davis M.D.

## 2021-09-28 NOTE — ANESTHESIA TIME REPORT
Anesthesia Start and Stop Event Times     Date Time Event    9/27/2021 0915 Ready for Procedure     0950 Anesthesia Start     2026 Anesthesia Stop        Responsible Staff  09/27/21    Name Role Begin End    Honorio Cobos M.D. Anesth 0950 2026        Preop Diagnosis (Free Text):  Pre-op Diagnosis     Labor pain        Preop Diagnosis (Codes):  Hood pregnancy at 39 weeks gestation, labor pain    Premium Reason  A. 3PM - 7AM    Comments:

## 2021-09-28 NOTE — PROGRESS NOTES
1800- Report received from JAMESON Plascencia RN. POC discussed. Pt repositioned on peanut ball. No needs at this time.     1850- Dr. Davis at bedside. SVE-complete, pt able to push well. Ok to begin pushing.     1900- Report to GAETANO Hale RN.

## 2021-09-29 NOTE — PROGRESS NOTES
Educated patient about self care and  care, all questions answered. Pt. Bonding well with infant, educated about feeding times and amount. Pt. Discharged home, escorted to car. Will follow up with Dr. Davis

## 2021-10-08 ENCOUNTER — OFFICE VISIT (OUTPATIENT)
Dept: OBGYN | Facility: CLINIC | Age: 31
End: 2021-10-08
Payer: COMMERCIAL

## 2021-10-08 DIAGNOSIS — O92.5 LACTATION SUPPRESSION: ICD-10-CM

## 2021-10-08 PROCEDURE — 99999 PR NO CHARGE: CPT | Performed by: NURSE PRACTITIONER

## 2021-10-08 NOTE — PROGRESS NOTES
Summary: Katja was exclusively breastfeeding in the hospital. Baby lost 8% of his birthweight at his day 3 appointment. At that time parents began supplementing with 2oz of formula after every feeding. Now Katja is breastfeeding every 2-3 hours day and night. Offering both breast for 8-9 minutes on each side, then 2oz of formula. Pumping in between most feedings, about an hour after baby eats, average of 3-4x daily.  Today: Baby latched quickly to the left side, cross cradle, transferring 10mls in approximately 11 minutes.Latched to the right breast, football hold, transferring 10mls in 8 minutes. Grandma fed bottle of formula while Katja pumped. Pumped with the hospital pump, yielding 5mls in 10 minutes.   Plan: Feed frequently throughout the day, every 1.5-3 hours, no need to wake for nighttime feedings. Offer both breast up to 10 minutes on each side. Follow with 1.5-2oz of expressed breastmilk/formula. If not breastfeeding, offer 2-2.5oz of expressed breastmilk/formula. Pump 8x every 24 hours, after or in place of breastfeeding for 10-15 minutes.   Follow Up:     Subjective:     Katja Edwards is a 30 y.o. female here for lactation care. She is here today with her mother, Tiffany and baby, Bang.    Concerns:   Latch on difficulties , nipple pain  and feeling that there is not enough milk      HPI:   Pertinent  history:   Mother does not have a history of advanced maternal age, GDM, hypertension prior to pregnancy, insulin resistance, multiple gestation, PCOS and thyroid disease. Common condition(s) which may interfere with milk supply.    Breast changes in pregnancy: Minimal  History of breast surgeries: No      FEEDING HISTORY:    Past breastfeeding history: First baby   Hospital course: Exclusively .   Currently: Breastfeeding every 2-3 hours day and night. Offering both breast for 8019 minutes on each side, then 2oz of formula. Pumping in between most  feedings, about an hour after baby eats, average of 3-4x daily.    Both breasts: Yes  Bottle feeds: 8-10/24h    Supplement: Expressed breast milk and Formula  Quantity: 2oz after every feeding  How given/devices:  Bottle    Nipple Shield Use: None    Breast Pumping:   Frequency: 3-4x daily, in between pumping   Type of pump: Motif  Flange size/type: 24mm  NO pain with pumping    Maternal ROS:  Constitutional: No fever, chills. Feeling well  Breasts: No soreness of breasts and No soreness of nipples  Psychiatric: Feels exhausted and Managing ok  Mental Health: No mention of feeling irritable, agitated, angry, overwhelmed, apathy, exhaustion nor having sleep changes outside infant feeds/demands or appetite changes       Objective:     Maternal Physical   General: No acute distress  Breasts: Symetrical , Soft, Plugged Duct - no evidence and Mastitis  - no S/S  Nipples: intact  Psychiatric: Normal mood and affect. Her behavior is normal. Judgment and thought content normal   Mental Health: Did NOT exhibit sadness, crying, feeling overwhelmed, agitation or hypervigilance.     Assessment/Plan & Lactation Counseling:     Infant exam on infant chart    Infant Weight History:   09/27/2021: 7# 6.9oz  09/30/2021: 6# 13.7oz  10/07/2021: 8# 0.8oz  10/08/2021: 8# 3.2oz    Infant intake at Breast:  L 10mls     R   10mls    Total: 20mls  Milk Transfer at this feeding:   Effective breastfeeding  Pumped: Type of Pump: [facility] grade    Quantity Pumped:    Total: 5mls  Initiation of Feeding: Infant initiates  Position of Feeding:    Right: football  Left: cross cradle  Attachment Achieved: rapidly  Nipple shield: N/A  Suck Pattern at the breast: Suck burst and normal rest  Suck Pattern on the bottle: Suck burst and normal rest  Behavior Following Observed Feeding: content and sleeping  Nipple Pain: None      Latch: Mom latches independently and Assisted latch for depth and symmetry   Suckling/Feeding: attaches, baby falls asleep, baby  fed effectively, baby roots, elicits SAVANNAH and frequent pauses  Milk Supply Available: low  Low milk supply:   Likely due to: ineffective or infrequent breast stimulation or milk removal and possible breast hypoplasia    Maternal Diagnosis/Problem:  Lactation Suppression     BREASTFEEDING PLAN  Discussed concerns and symptoms as listed above in assessment and guidance summarized below.  Topics reviewed included:  • Herbs and medications  A galactagogue is an herb or medication taken by a breastfeeding mother to increase her milk supply. We know that for centuries mothers around the world have sought out remedies to increase their milk supply. However, there is limited research on the safety and effectiveness of herbal galactagogues, which makes it hard for us to endorse them. It is not known if any of these herbs are truly effective, and it is difficult to predict how a specific herbal galactagogue will affect an individual, requiring “trial and error” in many situations. When effective, results are generally seen within 24-72 hours of starting an herbal galactagogue. Many of these herbs are used to decrease high blood sugar. If you are diabetic or have problems with low blood sugar, or thyroid disease  discuss the use of an herbal galactagogue with your health care provider. Not all women can increase their low milk supply with a galactagogue due to the many underlying causes of low milk production.  When taking a galactagogue, remember that frequent milk removal is still the most effective way to increase supply.    • Maternal Mental Health: Having a new baby can be joyful time for some new moms, but a difficult time for others. For all, it is a time of profound physical and emotional change. Balancing baby, family, partners and friends, work, pets, and preexisting or new life stressors as well as sleep deprivation can be extremely challenging. Untreated depression and anxiety can contribute to early cessation of  breastfeeding, so it is important both for the mental health and physical health of new moms and babies to get on the path to feeling better.   • Sleep or lack of: discussed strategies to manage restorative sleep, although short amounts, significant to the mental health of the mother.   o Mom goes to sleep right after 9pm +/- feeding  o Partner covers first late evening feeding (11pm/12am), settles baby,  then goes to bed  o Mom covers remainder of feedings (ok to pump and bottle feeding in place of latching throughout the night  • Self -care through relational support and interaction.   o Encouraged  support, rest, getting out of the house each day, walk or drive somewhere,  a coffee/tea at a drive through  o Allow others to bring you food, help with chores and errands, meeting for a cup of coffee  • Milk supply is dependent on glandular tissue development, hormonal influences, how many times the baby removes milk and how well the breasts are emptied in a 24 hour period. This is a biological reality that we can NOT work around. If, for any reason, your baby is not latching, or you are not able to nurse, then it is important for you to remove the milk instead by pumping or hand expression.  There's no magic trick, tea, food, drink, cookie or supplement that will increase your milk supply. One  must  effectively remove milk to continue to make and maximize milk. In the early days and weeks that can be 8+ times in 24 hours. For older babies, on average 6-7 + times in 24 hours.    • Low Milk Supply: Causes  o Insufficient breast development, no growth in pregnancy, wide spacing, minimal tissue medially or posteriorly. High androgens, insulin resitance  o Lack of nipple/breast stimulation (Baby at the breast but not suckling, mostly non nutritive sucking)  o Lack of breast emptying (Baby at the breast but no removing much milk)  • Feeding:   o Feed your baby every 1.5-3 hours, more often if baby acts hungry.    o Awaken baby for feeding if going over 3 hours in the day.   o Need to get in 8-10 feedings per 24 hours.   o No need to wake for nighttime feedings  • Supplement:   o Supplement with expressed milk and/or formula  - If breastfeeding offer 1.5-2oz  - If not breastfeeding offer 2-2.5oz  • When bottle-feeding, there are three primary things to consider:    o Nipple Shape:  - Look for a nipple that looks like a “breast at work” not a “breast at rest.”  A “breast at work” has a somewhat cone shape as the nipple and breast tissue is pulled into the baby’s mouth while feeding.  Your baby’s mouth should be able to go around the widest part of the nipple to form a wide-open gape on the bottle like that of a good latch at the breast. In contrast, a breast at rest might look more like, well, a breast: a roundish base with a  nipple.  If the bottle looks like this, your baby’s lips may not be able to get around the widest part of the nipple because it is just too wide resulting in a narrow gape that would hurt your nipple. This nipple shape may also make it difficult for your baby to make a complete seal with their lips which leads to air intake and milk spillage.Wide or narrow neck bottles are your choice.   o Flow Rate of the Nipple:  - The nipple flow should be slow.  Don’t just read the label, but notice how your baby is feeding and trust what you observe.  A study done a few years ago found that “slow flow” varied widely between brands and even between nipples of the same brand.  Try several nipples until you find one that results in a rhythmic sucking pattern but not chugging and gulping.  o Pacing the feeding:  - A slow flow nipple helps, but how you feed the baby is more important.  Good positioning can compensate for a faster flow nipple.  When bottle-feeding, the baby should control how much is consumed at a feeding.  Holding the baby in an upright position with the bottle horizontal ensures that the baby gets  "milk only when sucking.  Here is a nice video demonstrating this concept of paced bottle feeding,  https://www.youtube.com/watch?v=CqCVR7sZM6D  • Pacifier Use:  The [employer]' Position Paper reports: Although we recommend a conservative approach regarding pacifier use, we do not endorse a complete ban on the use of pacifiers, nor do we support an approach that induces parental guilt concerning their choices about the use of pacifiers.  • Positioning Techniques   o Suggested positions Cross cradle and Football  o Fine tune position by making sure your fingers beneath the breast as well as your bra, are out of the way of your baby's chin.  o Positioning:  Many positions shown, great sidelying at 7 minutes.   o See http://globalhealthmedia.org/portfolio-items/positions-for-breastfeeding/?zojnzvpfwAW=19778  • Latch on Techniques   o Fine tune latch:  - By holding your baby more securely at the breast, assisting your baby to stay attached by:  • Bringing your baby to your breast, not breast to the baby  • Your baby's cheek to touch breast securely, nose tipped back  • Hold your baby firmly in place so when your baby forgets to suck and picks it back up again your baby is in the correct spot. You will be extinguishing behavior and replacing it with a deeper latch to stimulate suck and provide satisfaction at the breast.  • Your baby needs as much breast as deep in the mouth as possible to allow your nipples to heal and for you more importantly to maximize efficiency at the breast  o Latch is asymmetrical, leading with the chin, getting the baby below the breast, as if offering a large \"deli mark sandwich\".  • Pumping Guidelines:  o Both breasts   o Pump 8 times in 24 hours  - After or in place of breastfeeding  o Type of pump:  • Hospital grade   • [facility] Settings http://www.ameda.com/healthcare-professionals/videos/ameda-platinum-with-hygienikit-video  o Speed: Start at 80 then turn down " to 60 after 1.5-2 minutes when you see milk in the flange channel  o Suction: To comfort, goal of  31%. NEVER to discomfort.   - Today you were at 25%  • Always double pump  o How long will vary woman to woman, typically 8-15 minutes, or 1-2 minutes after flow slows  o Flange Fit: Freely moving nipple in the tunnel with some movement of the areola.  - Today's evaluation indicates appropriate flange    • Storage (Acceptable guidelines for healthy term babies)  o 10 hours at room temp including your pieces, may rinse but not mandatory  o 8 days refrigerator, don't need  to refrigerate right away if using fresh pumped milk at the next feeding  o Freezer 1 year  o Deep freezer 2 years  o If baby drinks breastmilk from a fresh or refrigerated bottle of breastmilk,  you may return the unused portion to the refrigerator and use once at next feeding.     • Increasing supply besides Galactogogues and Pumping:   o Warmth  o Relaxation   o Physical, auditory narratives  o Childbirth relaxation Techniques  o Acupuncture and acupressure  o Shoulder Massages  o Take a nap    • Connect with other mothers:  o Facebook:   - [place] [facility]: [email or web address]  - Well-Nourished Babies (Private group for questions and support): https://www.facebook.com/groups/341923992558219/  o Weight Check Group  • Tuesdays 10am - 11am. Women's Health at [address], 3rd floor conference room  • Come and check your baby's weight, do a feeding and see how your baby is growing, visit with other mothers, plan on a walk or coffee date after group.  o Please wear a mask  o Due to space limitations - no strollers please (Fresh c/section moms should use the stroller)  o We would love to have dads stay, but moms won't breastfeed.  o The room is only available from 10am -11am, there is a meeting prior and after.   o All diapers must be taken with you   o Breastfeeding [place]   - This is an emotional, informational  and safe support [place] with your like-minded community that builds solidarity among moms  - The honest experiences of mothers are highly valued among the other mothers  - Tuesday  at 11am by Juan,  you will be sent an invitation each week, please unsubscribe as you wish  - You may instead copy and paste this link: [email or web address]    - Passcode  [id number]  - You may share this link with friends; please don't post on social media.      In Conclusion:   Family present has verbalized what they can realistically do based on family dynamics, understanding a plan has to be doable to be effective and can be renegotiated at any time.  This is a complex and intimate journey. When obstacles present themselves, it takes confidence, persistence and support. You are now the focus of our Breastfeeding Medicine team; we are here to support your decisions and goals.      Follow up requires close monitoring in this time sensitive window of opportunity to establish milk supply and facilitate the learning of  breastfeeding.    Mom is encouraged to e-mail to update how the plan is working.    Pediatrician appointment: November 18. 2021    Follow-up for infant weight check and dyad breastfeeding evaluation in 6 day(s)  Please call [phone] if you have not scheduled your next appointment    A total of 65 minutes, not including infant assessment time, with more than 50% was spent preparing to see the patient, obtaining and reviewing separately obtained history, performing a medically appropriate examination and evaluation, counseling and educating the family, referring and communicating with other health care professionals, documenting clinical information in the electronic health record, independently interpreting weighted feeds and infant growth results, communicating these results to the family and care coordination as detailed in the above note.     Braden ESPINOSA,  IBCLC  [facility]

## 2021-10-14 ENCOUNTER — GYNECOLOGY VISIT (OUTPATIENT)
Dept: OBGYN | Facility: CLINIC | Age: 31
End: 2021-10-14
Payer: COMMERCIAL

## 2021-10-14 DIAGNOSIS — O92.29 SORE NIPPLES DUE TO LACTATION: ICD-10-CM

## 2021-10-14 DIAGNOSIS — O92.5 LACTATION SUPPRESSION: ICD-10-CM

## 2021-10-14 PROCEDURE — 99215 OFFICE O/P EST HI 40 MIN: CPT | Performed by: NURSE PRACTITIONER

## 2021-10-14 NOTE — PROGRESS NOTES
Summary: Katja has been breastfeeding every 2 hours during the day, about every 2.5 hours at night. Pumping after feeding, yielding up to 1oz between both breast. Topping off with 2oz, recently baby has been stopping around 1.5oz.   Today: Baby latched quickly to the left side, cross cradle, transferring 14mls in approximately 8 minutes. Did not latch to the right side today, as nipple is very tender and hurts for the duration of the feeding.   Plan: Feed frequently throughout the day, every 1.5-3 hours, no need to wake for nighttime feedings. Offer left breast up to 10 minutes. Follow with 1.5-2oz of expressed breastmilk/formula. If not breastfeeding, offer 2-2.5oz of expressed breastmilk/formula. Pump 8x every 24 hours, after or in place of breastfeeding for 10-15 minutes. Work to get one longer stretch of sleep at night, having dad take the first feeding of the night. Next feeding pump and bottle feeding, then resume breastfeeding in the morning.   Follow Up: 2021    Subjective:     Katja Edwards is a 30 y.o. female here for lactation care. She is here today with her mother, Tiffany and baby, Bang.    Concerns: Latch on difficulties, nipple pain and feeling that there is not enough milk        HPI:   Pertinent  history:   Mother does not have a history of advanced maternal age, GDM, hypertension prior to pregnancy, insulin resistance, multiple gestation, PCOS and thyroid disease. Common condition(s) which may interfere with milk supply.    Breast changes in pregnancy: Minimal  History of breast surgeries: No      FEEDING HISTORY:    Past breastfeeding history: First baby   Hospital course: Exclusively .   Prior to consultation on 10/8/2021: Breastfeeding every 2-3 hours day and night. Offering both breast for 8-10 minutes on each side, then 2oz of formula. Pumping in between most feedings, about an hour after baby eats, average of 3-4x daily.    Currently  10/14/2021: Breastfeeding every 2 hours during the day, about every 2.5 hours at night. Pumping after feeding, yielding up to 1oz between both breast. Topping off with 2oz, recently baby has been stopping around 1.5oz.      Both breasts: Yes  Bottle feeds: 8-10/24h    Supplement: Expressed breast milk and Formula  Quantity: 2oz after every feeding  How given/devices:  Bottle    Nipple Shield Use: None    Breast Pumping:   Frequency: 8x daily, in between pumping   Type of pump: HGP with Motif pieces   Flange size/type: 24mm  NO pain with pumping    Maternal ROS:  Constitutional: No fever, chills. Feeling well  Breasts: No soreness of breasts and No soreness of nipples  Psychiatric: Feels exhausted and Managing ok  Mental Health: No mention of feeling irritable, agitated, angry, overwhelmed, apathy, exhaustion nor having sleep changes outside infant feeds/demands or appetite changes       Objective:     Maternal Physical   General: No acute distress  Breasts: Symetrical , Soft, Plugged Duct - no evidence and Mastitis  - no S/S  Nipples: intact  Psychiatric: Normal mood and affect. Her behavior is normal. Judgment and thought content normal   Mental Health: Did NOT exhibit sadness, crying, feeling overwhelmed, agitation or hypervigilance.     Assessment/Plan & Lactation Counseling:     Infant exam on infant chart    Infant Weight History:   09/27/2021: 7# 6.9oz  09/30/2021: 6# 13.7oz  10/07/2021: 8# 0.8oz  10/08/2021: 8# 3.2oz  10/14/2021: 8# 11.2oz    Infant intake at Breast:  L 14mls     R   Not Offered    Total: 14mls  Milk Transfer at this feeding:   Effective breastfeeding from left breast, left 10mls   Pumped: Type of Pump: Hospital grade    Quantity Pumped:    Total: 30mls  Initiation of Feeding: Infant initiates  Position of Feeding:    Right: N/A  Left: cross cradle  Attachment Achieved: rapidly  Nipple shield: N/A  Suck Pattern at the breast: Suck burst and normal rest  Suck Pattern on the bottle: Suck burst  and normal rest  Behavior Following Observed Feeding: content until placed in car seat  Nipple Pain: Yes    Latch: Mom latches independently   Suckling/Feeding: attaches, baby falls asleep, baby fed effectively, baby roots, elicits SAVANNAH and frequent pauses  Milk Supply Available: low, increasing   Low milk supply:   Likely due to: ineffective or infrequent breast stimulation or milk removal and possible breast hypoplasia    Maternal Diagnosis/Problem:  Lactation Suppression   Sore Nipples     BREASTFEEDING PLAN  Discussed concerns and symptoms as listed above in assessment and guidance summarized below.  Topics reviewed included:  • Sleep or lack of: discussed strategies to manage restorative sleep, although short amounts, significant to the mental health of the mother.   o Mom goes to sleep right after 8pm +/- feeding  o Partner covers first late evening feeding (10pm/11pm), settles baby,  then goes to bed  o Mom covers remainder of feedings (ok to pump and bottle feeding in place of latching throughout the night  • Milk supply is increasing with consistent pumping and using the hospital grade pump.     • Feeding:   o Feed your baby every 1.5-3 hours, more often if baby acts hungry.   o Awaken baby for feeding if going over 3 hours in the day.   o Need to get in 8-10 feedings per 24 hours.   o No need to wake for nighttime feedings  • Supplement:   o Supplement with expressed milk and/or formula  - If breastfeeding offer 1.5-2oz  - If not breastfeeding offer 2-2.5oz  • Sore Nipples  o Take a break from latching on the right side to allow full healing  o Nipple cream/butter  • Pumping Guidelines:  o Both breasts   o Pump 8 times in 24 hours  - After or in place of breastfeeding  o Type of pump:  • Hospital grade   • [employer] [place] Settings [email or web address]/healthcare-[email or web address]/videos/ameda-platinum-[email or web address]-hygienikit-video  o Speed: Start at 80 then turn down to 60 after 1.5-2 minutes when you see milk in the  flange channel  o Suction: To comfort, goal of  31%. NEVER to discomfort.   - Today you were at 25%  • Always double pump  o How long will vary woman to woman, typically 8-15 minutes, or 1-2 minutes after flow slows  o Flange Fit: Freely moving nipple in the tunnel with some movement of the areola.  - Today's evaluation indicates appropriate flange    • Storage (Acceptable guidelines for healthy term babies)  o 10 hours at room temp including your pieces, may rinse but not mandatory  o 8 days refrigerator, don't need  to refrigerate right away if using fresh pumped milk at the next feeding  o Freezer 1 year  o Deep freezer 2 years  o If baby drinks breastmilk from a fresh or refrigerated bottle of breastmilk,  you may return the unused portion to the refrigerator and use once at next feeding.     • Increasing supply besides Galactogogues and Pumping:   o Warmth  o Relaxation   o Physical, auditory narratives  o Childbirth relaxation Techniques  o Acupuncture and acupressure  o Shoulder Massages  o Take a nap    • Connect with other mothers:  o Facebook:   - [place] Breastfeeds: [email or web address]/[place].breastfeeds/  - Well-Nourished Babies (Private group for questions and support): https://www.facebook.com/groups/768442501610712/  o Weight Check Group  • Tuesdays 10am - 11am. Women's Health at [address], 3rd floor conference room  • Come and check your baby's weight, do a feeding and see how your baby is growing, visit with other mothers, plan on a walk or coffee date after group.  o Please wear a mask  o Due to space limitations - no strollers please (Fresh c/section moms should use the stroller)  o We would love to have dads stay, but moms won't breastfeed.  o The room is only available from 10am -11am, there is a meeting prior and after.   o All diapers must be taken with you   o Breastfeeding [place]   - This is an emotional, informational and safe support [place] with your like-minded  community that builds solidarity among moms  - The honest experiences of mothers are highly valued among the other mothers  - Tuesday  at 11am by Juan,  you will be sent an invitation each week, please unsubscribe as you wish  - You may instead copy and paste this link: [email or web address]    - Passcode  [id number]  - You may share this link with friends; please don't post on social media.      In Conclusion:     Follow up requires close monitoring in this time sensitive window of opportunity to establish milk supply and facilitate the learning of  breastfeeding.    Mom is encouraged to e-mail to update how the plan is working.    Pediatrician appointment: November 18. 2021    Follow-up for infant weight check and dyad breastfeeding evaluation in 6 day(s)  Please call [phone] if you have not scheduled your next appointment    A total of 50 minutes with more than 50% was spent preparing to see the patient, obtaining and reviewing separately obtained history, performing a medically appropriate examination and evaluation, counseling and educating the family, referring and communicating with other health care professionals, documenting clinical information in the electronic health record, independently interpreting weighted feeds and infant growth results, communicating these results to the family and care coordination as detailed in the above note.      Tracie Cordova

## 2021-10-20 ENCOUNTER — OFFICE VISIT (OUTPATIENT)
Dept: OBGYN | Facility: CLINIC | Age: 31
End: 2021-10-20
Payer: COMMERCIAL

## 2021-10-20 DIAGNOSIS — O92.29 SORE NIPPLES DUE TO LACTATION: ICD-10-CM

## 2021-10-20 DIAGNOSIS — O92.5 LACTATION SUPPRESSION: ICD-10-CM

## 2021-10-20 PROCEDURE — 99215 OFFICE O/P EST HI 40 MIN: CPT | Performed by: NURSE PRACTITIONER

## 2021-10-20 NOTE — PROGRESS NOTES
Summary: Katja has been feeding every 2 hours during the day, up to 3-3.5 hours one time at night. Offering just the left side when breastfeeding, allowing the right nipple to heal. Pumping after or in place of feeding, yielding up to 1-1.5oz between both breast. Topping off with 2oz, recently baby has been stopping around 1-1.5oz. Dad offers 3oz in place of one feeding at night. Mom fed 20oz yesterday, 5oz pumped milk, 15oz formula.  Today: Latched baby to the right side, football, with assistance to overcorrect latch to protect Katja's nipple. Baby became sleepy very quickly, transferring 4mls. Latched to the right side again, removing an additional 4mls. Mother latched independently to the left breast, cross cradle, and then was assisted with latch but baby only transferred 2mls. Pumped both breast, yielding 40mls total, 20ml per side.  Plan: Feed frequently throughout the day as current routine, every 1.5-3 hours, no need to wake for nighttime feedings. Practice breastfeeding when dad is home to relieve the pressure of doing everything at every feeding. Focus on pumping during the day to protect milk supply. Dad can continue to take one feeding during the night to allow mom a longer stretch of sleep.    Follow Up: 1-2 weeks. Referred to Ped PT to evaluate neck preference and its impact on breastfeeding.     Subjective:     Katja Edwards is a 30 y.o. female here for lactation care. She is here today with her  baby, Bang.    Concerns: Latch on difficulties, nipple pain and feeling that there is not enough milk        HPI:   Pertinent  history:   Mother does not have a history of advanced maternal age, GDM, hypertension prior to pregnancy, insulin resistance, multiple gestation, PCOS and thyroid disease. Common condition(s) which may interfere with milk supply.    Breast changes in pregnancy: Minimal  History of breast surgeries: No      FEEDING HISTORY:    Past breastfeeding history: First  baby   Hospital course: Exclusively .   Prior to consultation on 10/8/2021: Breastfeeding every 2-3 hours day and night. Offering both breast for 8-10 minutes on each side, then 2oz of formula. Pumping in between most feedings, about an hour after baby eats, average of 3-4x daily.    Prior to consultation on 10/14/2021: Breastfeeding every 2 hours during the day, about every 2.5 hours at night. Pumping after feeding, yielding up to 1oz between both breast. Topping off with 2oz, recently baby has been stopping around 1.5oz.    Currently 10/14/2021: Feeding every 2 hours during the day, up to 3-3.5 hours one time at night. Offering just the left side when breastfeeding, allowing the right nipple to heal. Pumping after or in place of feeding, yielding up to 1-1.5oz between both breast. Topping off with 2oz, recently baby has been stopping around 1-1.5oz. Dad offers 3oz in place of one feeding at night.     Both breasts: Yes  Bottle feeds: 8-10/24h    Supplement: Expressed breast milk and Formula (averaging 18oz-20oz/24 hours)  Quantity: 1-2oz after every feeding Baby is limiting intake  How given/devices:  Bottle    Nipple Shield Use: None    Breast Pumping:   Frequency: 6-8x daily, yielding 1-1.5oz after breastfeeding  Type of pump: HGP with Motif pieces   Flange size/type: 24mm  NO pain with pumping    Maternal ROS:  Constitutional: No fever, chills. Feeling well  Breasts: No soreness of breasts and Soreness of nipples  Psychiatric: Feels exhausted and Managing ok  Mental Health: No mention of feeling irritable, agitated, angry, overwhelmed, apathy, exhaustion nor having sleep changes outside infant feeds/demands or appetite changes.     Objective:     Maternal Physical   General: No acute distress  Breasts: Symetrical , Soft, Plugged Duct - no evidence and Mastitis  - no S/S  Nipples: intact, right slightly erythematous  Psychiatric: Normal mood and affect. Her behavior is normal. Judgment and thought  content normal   Mental Health: Did NOT exhibit sadness, crying, feeling overwhelmed, agitation or hypervigilance.     Assessment/Plan & Lactation Counseling:     Infant exam on infant chart    Infant Weight History:   2021: 7# 6.9oz  2021: 6# 13.7oz  10/07/2021: 8# 0.8oz  10/08/2021: 8# 3.2oz  10/14/2021: 8# 11.2oz  10/20/2021: 9# 5.8oz    Infant intake at Breast:  R 4mls + 4mls    L  2mls   Total: 10mls  Milk Transfer at this feeding:    Ineffective breastfeeding    Pumped: Type of Pump: Hospital grade    Quantity Pumped:    Total: 40mls  Initiation of Feeding: Infant initiates  Position of Feeding:    Right: football  Left: cross cradle  Attachment Achieved: rapidly  Nipple shield: N/A  Suck Pattern at the breast: Non nutrative   Suck Pattern on the bottle: Non nutritive, difficulty managing breathing   Behavior Following Observed Feeding: content until placed in car seat  Nipple Pain: Yes    Latch: Mom latches independently left breast, Assisted latch on the right breast  Suckling/Feeding: attaches, baby falls asleep, baby fed ineffectively, baby roots, elicits SAVANNAH and frequent pauses  Milk Supply Available: low  Low milk supply:   Likely due to: late start stimulation or insufficient glandular tissue     Maternal Diagnosis/Problem:  Lactation Suppression   Sore Nipple    BREASTFEEDING PLAN  Discussed concerns and symptoms as listed above in assessment and guidance summarized below.  Topics reviewed included:  Infant neck preference this is a normal  finding that should correct itself over the next few weeks.  When it does not, referral to ped PT is recommended.   Cristina Sanders at [email or web address] and [phone]  When there is a neck preference it can make latching more difficult.    Mom can  use cross cradle on the left and football on the right    Infant head can be supported in the car seat.    Bottle feeding baby's can be encouraged to look to the opposite side of the  preference  Infant can be can talked to from the side encouraging baby to turn to.   • Milk supply is slowly increasing with consistent pumping and using the hospital grade pump.     • Feeding:   o Doing an excellent job keeping baby fed well, continue with pattern, mom willing to offer baby more food, but he limits intake  • Supplement:   o Supplement with expressed milk and/or formula  - If breastfeeding offer 1.5-2oz  - If not breastfeeding offer 2.5-3oz  • Sore Nipples  o Continue to take a break from latching on the right side to allow full healing  o Nipple cream/butter  • Pumping Guidelines:  o Both breasts   o Pump 8 times in 24 hours  - After or in place of breastfeeding  o Type of pump:  • Hospital grade   • Always double pump  o How long will vary woman to woman, typically 8-15 minutes, or 1-2 minutes after flow slows  o Flange Fit: Freely moving nipple in the tunnel with some movement of the areola.  - Today's evaluation indicates appropriate flange    • Storage (Acceptable guidelines for healthy term babies)  o 10 hours at room temp including your pieces, may rinse but not mandatory  o 8 days refrigerator, don't need  to refrigerate right away if using fresh pumped milk at the next feeding  o Freezer 1 year  o Deep freezer 2 years  o If baby drinks breastmilk from a fresh or refrigerated bottle of breastmilk,  you may return the unused portion to the refrigerator and use once at next feeding.     • Connect with other mothers:  o Facebook:   - [place] Breast[facility]eds: [email or web address].[employer].com/[place].breastfeeds/  - Well-Nourished Babies (Private group for questions and support): https://www.facebook.com/groups/917027800707220/  o Weight Check Group  • Tuesdays 10am - 11am. Women's Health at [facility], [address] [place] [address], 3rd floor conference room  • Come and check your baby's weight, do a feeding and see how your baby is growing, visit with other mothers, plan on a walk or coffee date after group.  o Please wear a  mask  o Due to space limitations - no strollers please (Fresh c/section moms should use the stroller)  o We would love to have dads stay, but moms won't breastfeed.  o The room is only available from 10am -11am, there is a meeting prior and after.   o All diapers must be taken with you   o Breastfeeding [place]   - This is an emotional, informational and safe support [place] with your like-minded community that builds solidarity among moms  - The honest experiences of mothers are highly valued among the other mothers  - Tuesday  at 11am by [place],  you will be sent an invitation each week, please unsubscribe as you wish  - You may instead copy and paste this link: [email or web address]    - Passcode  [id number]  - You may share this link with friends; please don't post on social media.      In Conclusion:     Follow up requires close monitoring in this time sensitive window of opportunity to establish milk supply and facilitate the learning of  breastfeeding.    Mom is encouraged to e-mail to update how the plan is working.    Pediatrician appointment: November 18. 2021    Follow-up for infant weight check and dyad breastfeeding evaluation in 1-2 weeks or As Needed  Please call [phone] if you have not scheduled your next appointment    A total of 55 minutes with more than 50% was spent preparing to see the patient, obtaining and reviewing separately obtained history, performing a medically appropriate examination and evaluation, counseling and educating the family, referring and communicating with other health care professionals, documenting clinical information in the electronic health record, independently interpreting weighted feeds and infant growth results, communicating these results to the family and care coordination as detailed in the above note.      LEN Daniel.

## 2021-12-12 NOTE — PROGRESS NOTES
1855 -  here with EDC of  = 39.3 weeks reporting UCs q4m. Reports positive FM, denies LOF or VB. Taken to [facility] accompanied by FOB, instructed to change and call out when ready.    - POC discussed, questions answered. VS taken, monitors applied x2.    - SVE 3/90/-2.   - report given to Dr. Valadez, offer to recheck in one hour to assess for cervical change or give ambien and discharge.    - POC discussed, questions answered. SVE unchanged. Discharge instructions given, all questions answered, understanding verbalized.    - ambien given (see MAR).    - discharged home in stable walking condition accompanied by FOB.   
19

## 2022-09-30 ENCOUNTER — OFFICE VISIT (OUTPATIENT)
Dept: URGENT CARE | Facility: PHYSICIAN GROUP | Age: 32
End: 2022-09-30
Payer: COMMERCIAL

## 2022-09-30 VITALS
SYSTOLIC BLOOD PRESSURE: 116 MMHG | HEIGHT: 66 IN | DIASTOLIC BLOOD PRESSURE: 82 MMHG | OXYGEN SATURATION: 98 % | TEMPERATURE: 97.2 F | RESPIRATION RATE: 16 BRPM | HEART RATE: 82 BPM | WEIGHT: 150 LBS | BODY MASS INDEX: 24.11 KG/M2

## 2022-09-30 DIAGNOSIS — R07.89 CHEST TIGHTNESS: ICD-10-CM

## 2022-09-30 DIAGNOSIS — R05.9 COUGH: ICD-10-CM

## 2022-09-30 DIAGNOSIS — J20.9 ACUTE BRONCHITIS, UNSPECIFIED ORGANISM: ICD-10-CM

## 2022-09-30 PROCEDURE — 99213 OFFICE O/P EST LOW 20 MIN: CPT

## 2022-09-30 RX ORDER — ALBUTEROL SULFATE 90 UG/1
1 AEROSOL, METERED RESPIRATORY (INHALATION) EVERY 6 HOURS PRN
Qty: 8.5 G | Refills: 0 | Status: SHIPPED | OUTPATIENT
Start: 2022-09-30

## 2022-09-30 RX ORDER — BENZONATATE 100 MG/1
100 CAPSULE ORAL 2 TIMES DAILY PRN
Qty: 20 CAPSULE | Refills: 0 | Status: SHIPPED | OUTPATIENT
Start: 2022-09-30

## 2022-09-30 RX ORDER — NORGESTIMATE AND ETHINYL ESTRADIOL 7DAYSX3 28
KIT ORAL
COMMUNITY
Start: 2022-07-21

## 2022-09-30 RX ORDER — PREDNISONE 10 MG/1
20 TABLET ORAL DAILY
Qty: 10 TABLET | Refills: 0 | Status: SHIPPED | OUTPATIENT
Start: 2022-09-30 | End: 2022-10-05

## 2022-09-30 ASSESSMENT — ENCOUNTER SYMPTOMS
WHEEZING: 0
COUGH: 1
SPUTUM PRODUCTION: 1
CHILLS: 0
SORE THROAT: 0
VOMITING: 0
MYALGIAS: 0
SHORTNESS OF BREATH: 1
FEVER: 0
DIARRHEA: 0
ABDOMINAL PAIN: 0

## 2022-09-30 NOTE — PROGRESS NOTES
Subjective     Katja Edwards is a 31 y.o. female who presents with Cough (Cough, runny nose, green phlegm ongoing 10 days.  2 at home negative test.)            HPI    Patient presents with symptoms for 10 days now.  She endorses fatigue, nasal congestion, and cough.  She reports her cough to be productive of greenish phlegm, worse at night.  She also endorses chest tightness during bouts of this coughing.  She feels she is more short of breath than usual especially after coughing bouts.  She denies any wheezing.  She has been taking Mucinex, Robitussin, and using saline nasal spray, but reports these do not provide any consistent relief.  She denies any fever, chills, sore throat, vomiting, or diarrhea.  Patient tested negative twice for COVID at home.  She reports that she is COVID vaccinated.  She reports that her 60-fnich-ryy son is sick as well with similar symptoms.    Patient's current problem list, medications, and past medical/surgical history were reviewed in Epic.    PMH:  has no past medical history on file.  MEDS:   Current Outpatient Medications:     ibuprofen (MOTRIN) 600 MG Tab, Take 1 Tablet by mouth every 6 hours as needed., Disp: 60 Tablet, Rfl: 1  ALLERGIES:   Allergies   Allergen Reactions    Keflex Hives     Full body rash and hives     Pcn [Penicillins] Unspecified     As a baby      SURGHX: No past surgical history on file.  SOCHX:  reports that she has never smoked. She has never used smokeless tobacco. She reports current alcohol use. She reports that she does not use drugs.  FH: Reviewed with patient, not pertinent to this visit.       Review of Systems   Constitutional:  Positive for malaise/fatigue. Negative for chills and fever.   HENT:  Positive for congestion. Negative for ear pain and sore throat.    Respiratory:  Positive for cough, sputum production and shortness of breath. Negative for wheezing.    Gastrointestinal:  Negative for abdominal pain, diarrhea and vomiting.  "  Musculoskeletal:  Negative for myalgias.            Objective     /82   Pulse 82   Temp 36.2 °C (97.2 °F) (Temporal)   Resp 16   Ht 1.676 m (5' 6\")   Wt 68 kg (150 lb)   LMP 09/16/2022 (Approximate)   SpO2 98%   Breastfeeding No   BMI 24.21 kg/m²      Physical Exam  Constitutional:       Appearance: Normal appearance.   HENT:      Head: Normocephalic.      Nose: Nose normal.   Eyes:      Extraocular Movements: Extraocular movements intact.   Cardiovascular:      Rate and Rhythm: Normal rate and regular rhythm.      Pulses: Normal pulses.      Heart sounds: Normal heart sounds.   Pulmonary:      Effort: Pulmonary effort is normal.      Breath sounds: Normal breath sounds.   Musculoskeletal:         General: Normal range of motion.      Cervical back: Normal range of motion.   Skin:     General: Skin is warm and dry.   Neurological:      General: No focal deficit present.      Mental Status: She is alert.   Psychiatric:         Mood and Affect: Mood normal.         Behavior: Behavior normal.         Judgment: Judgment normal.       Assessment & Plan        1. Acute bronchitis, unspecified organism    - predniSONE (DELTASONE) 10 MG Tab; Take 2 Tablets by mouth every day for 5 days.  Dispense: 10 Tablet; Refill: 0  - benzonatate (TESSALON) 100 MG Cap; Take 1 Capsule by mouth 2 times a day as needed for Cough.  Dispense: 20 Capsule; Refill: 0  - albuterol 108 (90 Base) MCG/ACT Aero Soln inhalation aerosol; Inhale 1 Puff every 6 hours as needed for Shortness of Breath.  Dispense: 8.5 g; Refill: 0    2. Chest tightness    - albuterol 108 (90 Base) MCG/ACT Aero Soln inhalation aerosol; Inhale 1 Puff every 6 hours as needed for Shortness of Breath.  Dispense: 8.5 g; Refill: 0    3. Cough    - benzonatate (TESSALON) 100 MG Cap; Take 1 Capsule by mouth 2 times a day as needed for Cough.  Dispense: 20 Capsule; Refill: 0    Patient's presentation is consistent with acute bronchitis.  She is prescribed prednisone " daily for 5 days.  She is educated on side effects, recommended taking this with food.  May take Tessalon Perles twice daily as needed for cough.  May use albuterol inhaler as needed for shortness of breath, chest tightness, wheezing, or bouts of coughing.  Discussed treatment plan with patient, she is agreeable and verbalized understanding.  Educated patient on signs and symptoms watch out for, when to return to the clinic or go to the ER.    Recommended supportive treatment at home:  OTC Tylenol or Motrin for fever/discomfort.  OTC supportive care for nasal congestion - saline nasal spray/Flonase nasal spray and/or netipot  Humidifier and steam inhalation/warm showers.  Increase oral fluid intake.    Electronically Signed by FRANCISCA Quiroga

## 2022-12-07 ENCOUNTER — HOSPITAL ENCOUNTER (OUTPATIENT)
Dept: RADIOLOGY | Facility: MEDICAL CENTER | Age: 32
End: 2022-12-07
Attending: OBSTETRICS & GYNECOLOGY
Payer: COMMERCIAL

## 2022-12-07 DIAGNOSIS — N63.10 MASS OF RIGHT BREAST, UNSPECIFIED QUADRANT: ICD-10-CM

## 2022-12-07 PROCEDURE — 76642 ULTRASOUND BREAST LIMITED: CPT | Mod: RT

## 2022-12-07 PROCEDURE — G0279 TOMOSYNTHESIS, MAMMO: HCPCS

## 2023-04-23 ENCOUNTER — OFFICE VISIT (OUTPATIENT)
Dept: URGENT CARE | Facility: PHYSICIAN GROUP | Age: 33
End: 2023-04-23
Payer: COMMERCIAL

## 2023-04-23 VITALS
WEIGHT: 151.8 LBS | SYSTOLIC BLOOD PRESSURE: 102 MMHG | OXYGEN SATURATION: 95 % | HEIGHT: 66 IN | DIASTOLIC BLOOD PRESSURE: 60 MMHG | RESPIRATION RATE: 18 BRPM | BODY MASS INDEX: 24.4 KG/M2 | HEART RATE: 67 BPM | TEMPERATURE: 98.2 F

## 2023-04-23 DIAGNOSIS — R10.31 RLQ ABDOMINAL PAIN: ICD-10-CM

## 2023-04-23 LAB
APPEARANCE UR: CLEAR
BILIRUB UR STRIP-MCNC: NEGATIVE MG/DL
COLOR UR AUTO: YELLOW
GLUCOSE UR STRIP.AUTO-MCNC: NEGATIVE MG/DL
KETONES UR STRIP.AUTO-MCNC: NEGATIVE MG/DL
LEUKOCYTE ESTERASE UR QL STRIP.AUTO: NEGATIVE
NITRITE UR QL STRIP.AUTO: NEGATIVE
PH UR STRIP.AUTO: 6.5 [PH] (ref 5–8)
POCT INT CON NEG: NEGATIVE
POCT INT CON POS: POSITIVE
POCT URINE PREGNANCY TEST: NEGATIVE
PROT UR QL STRIP: NEGATIVE MG/DL
RBC UR QL AUTO: NEGATIVE
SP GR UR STRIP.AUTO: 1.02
UROBILINOGEN UR STRIP-MCNC: 0.2 MG/DL

## 2023-04-23 PROCEDURE — 81002 URINALYSIS NONAUTO W/O SCOPE: CPT | Performed by: PHYSICIAN ASSISTANT

## 2023-04-23 PROCEDURE — 81025 URINE PREGNANCY TEST: CPT | Performed by: PHYSICIAN ASSISTANT

## 2023-04-23 PROCEDURE — 99213 OFFICE O/P EST LOW 20 MIN: CPT | Performed by: PHYSICIAN ASSISTANT

## 2023-04-23 ASSESSMENT — ENCOUNTER SYMPTOMS
VOMITING: 0
EYE DISCHARGE: 0
FEVER: 0
NAUSEA: 0
DIARRHEA: 0
ABDOMINAL PAIN: 1
EYE REDNESS: 0

## 2023-04-23 NOTE — PROGRESS NOTES
Subjective     Katja Edwards is a 32 y.o. female who presents with Abdominal Pain (Right side,x3 days)            Abdominal Pain  This is a new problem. Episode onset: x 3 days ago. The problem occurs constantly. The problem has been unchanged. The pain is located in the RLQ. The quality of the pain is dull and aching. The abdominal pain does not radiate. Pertinent negatives include no diarrhea, dysuria, fever, frequency, hematuria, nausea or vomiting. Nothing aggravates the pain. The pain is relieved by Nothing. Treatments tried: OTC IBU.     The patient reports no abnormal vaginal discharge.  She reports no abnormal vaginal bleeding.  The patient's last menstrual period was 3/24/2023.    The patient denies previous abdominal surgeries.    PMH:  has no past medical history on file.  MEDS:   Current Outpatient Medications:     Norgestim-Eth Estrad Triphasic 0.18/0.215/0.25 MG-35 MCG Tab, TAKE 1 TABLET BY MOUTH EVERY DAY FOR 90 DAYS, Disp: , Rfl:     benzonatate (TESSALON) 100 MG Cap, Take 1 Capsule by mouth 2 times a day as needed for Cough., Disp: 20 Capsule, Rfl: 0    albuterol 108 (90 Base) MCG/ACT Aero Soln inhalation aerosol, Inhale 1 Puff every 6 hours as needed for Shortness of Breath., Disp: 8.5 g, Rfl: 0    ibuprofen (MOTRIN) 600 MG Tab, Take 1 Tablet by mouth every 6 hours as needed., Disp: 60 Tablet, Rfl: 1  ALLERGIES:   Allergies   Allergen Reactions    Keflex Hives     Full body rash and hives     Pcn [Penicillins] Unspecified     As a baby      SURGHX: No past surgical history on file.  SOCHX:  reports that she has never smoked. She has never used smokeless tobacco. She reports current alcohol use. She reports that she does not use drugs.  FH: Family history was reviewed, no pertinent findings to report      Review of Systems   Constitutional:  Negative for fever.   Eyes:  Negative for discharge and redness.   Gastrointestinal:  Positive for abdominal pain. Negative for diarrhea, nausea and vomiting.  "  Genitourinary:  Negative for dysuria, frequency and hematuria.            Objective     /60 (BP Location: Right arm, Patient Position: Sitting, BP Cuff Size: Adult)   Pulse 67   Temp 36.8 °C (98.2 °F) (Temporal)   Resp 18   Ht 1.676 m (5' 6\")   Wt 68.9 kg (151 lb 12.8 oz)   SpO2 95%   BMI 24.50 kg/m²      Physical Exam  Constitutional:       General: She is not in acute distress.     Appearance: Normal appearance. She is not ill-appearing.   HENT:      Head: Normocephalic and atraumatic.      Right Ear: External ear normal.      Left Ear: External ear normal.      Nose: Nose normal.      Mouth/Throat:      Mouth: Mucous membranes are moist.      Pharynx: Oropharynx is clear. No posterior oropharyngeal erythema.   Eyes:      Extraocular Movements: Extraocular movements intact.      Conjunctiva/sclera: Conjunctivae normal.   Cardiovascular:      Rate and Rhythm: Normal rate and regular rhythm.      Heart sounds: Normal heart sounds.   Pulmonary:      Effort: Pulmonary effort is normal. No respiratory distress.      Breath sounds: Normal breath sounds. No wheezing.   Abdominal:      General: Bowel sounds are normal.      Palpations: Abdomen is soft.      Tenderness: There is abdominal tenderness in the right lower quadrant. There is no right CVA tenderness, left CVA tenderness, guarding or rebound.   Musculoskeletal:         General: Normal range of motion.      Cervical back: Normal range of motion and neck supple.   Skin:     General: Skin is warm and dry.   Neurological:      Mental Status: She is alert and oriented to person, place, and time.             Progress:  Results for orders placed or performed in visit on 04/23/23   POCT Urinalysis   Result Value Ref Range    POC Color yellow Negative    POC Appearance clear Negative    POC Glucose negative Negative mg/dL    POC Bilirubin negative Negative mg/dL    POC Ketones negative Negative mg/dL    POC Specific Gravity 1.025 <1.005 - >1.030    POC " Blood negative Negative    POC Urine PH 6.5 5.0 - 8.0    POC Protein negative Negative mg/dL    POC Urobiligen 0.2 Negative (0.2) mg/dL    POC Nitrites negative Negative    POC Leukocyte Esterase negative Negative   POCT Pregnancy   Result Value Ref Range    POC Urine Pregnancy Test Negative     Internal Control Positive Positive     Internal Control Negative Negative        Pelvic US - pending (scheduled for 4/24/2023 at 2:00pm)            Assessment & Plan      1. RLQ abdominal pain  - POCT Urinalysis  - POCT Pregnancy  - US-PELVIC COMPLETE (TRANSABDOMINAL/TRANSVAGINAL) (COMBO); Future    The patient's presenting symptoms and physical exam findings are consistent with right lower quadrant abdominal pain.  The patient has been experiencing a persistent dull ache to her right lower quadrant over the past several days.  The patient states her symptoms have not progressed and/or worsen, but also have not improved/resolved.  On physical exam, the patient had mild localized tenderness to the right lower quadrant without guarding or rebound.  The patient's bowel sounds are within normal limits.  No CVA tenderness was appreciated.  The remainder the patient's physical exam today in clinic was normal.  The patient is nontoxic and appears in no acute distress.  The patient's vital signs are stable and within normal limits.  She is afebrile today in clinic.  The patient's POCT urinalysis today in clinic showed no signs of infection with negative leukocyte esterase, negative blood, and negative nitrates.  The patient's POCT pregnancy test was also negative.  The cause of the patient's current symptoms is unknown at this time.  Based on the patient's presenting symptoms and physical exam endings, I have low clinical suspicion for acute appendicitis.  I suspect that if the patient were to have acute appendicitis she would have worsening right lower quadrant abdominal pain with concurrent symptoms such as fever, nausea/vomiting,  and/or decreased appetite.  Additionally, I would suspect that the patient's vitals would be abnormal to indicate acute inflammation and/or infection.  The patient's current symptoms could be related to an acute pelvic etiology, such as an ovarian cyst.  The patient states she has been experiencing irregular menstrual periods.  We will order a pelvic ultrasound to further evaluate the patient's current symptoms.  Patient's pelvic ultrasound is scheduled for tomorrow, 4/24 at 2 PM.  We will contact the patient with results of her pelvic ultrasound, and will treat accordingly.  Advised the patient to monitor for worsening signs and or symptoms.  Recommend OTC medications and supportive care for symptomatic management.  Discussed strict ED precautions with the patient, and she verbalized understanding.    Differential diagnoses, supportive care, and indications for immediate follow-up discussed with patient.   Instructed to return to clinic or nearest emergency department for any change in condition, further concerns, or worsening of symptoms.    OTC Tylenol or Motrin for fever/discomfort.  Drink plenty of fluids  Monitor for worsening signs or symptoms  Follow-up with PCP as needed  Return to clinic or go to the ED if symptoms worsen or fail to improve, or if the patient develop worsening/increasing/persistent abdominal pain, nausea, vomiting, diarrhea, bloody stools, urinary symptoms, dysuria, hematuria, abnormal vaginal discharge, abnormal vaginal bleeding, fever/chills, and/or any concerning symptoms    Discussed plan with the patient, and she agrees to the above.     I personally reviewed prior external notes and test results pertinent to today's visit.  I have independently reviewed and interpreted all diagnostics ordered during this urgent care visit.     Please note that this dictation was created using voice recognition software. I have made every reasonable attempt to correct obvious errors, but I expect that  there may be errors of grammar and possibly content that I did not discover before finalizing the note.     This note was electronically signed by Herlinda Hood PA-C

## 2024-04-16 ENCOUNTER — HOSPITAL ENCOUNTER (OUTPATIENT)
Dept: RADIOLOGY | Facility: MEDICAL CENTER | Age: 34
End: 2024-04-16
Attending: FAMILY MEDICINE
Payer: COMMERCIAL

## 2024-04-16 DIAGNOSIS — R05.3 CHRONIC COUGH: ICD-10-CM

## 2024-04-16 DIAGNOSIS — N63.41 SUBAREOLAR MASS OF RIGHT BREAST: ICD-10-CM

## 2024-04-16 PROCEDURE — 71046 X-RAY EXAM CHEST 2 VIEWS: CPT

## 2024-04-16 PROCEDURE — 76642 ULTRASOUND BREAST LIMITED: CPT | Mod: RT

## 2024-04-16 PROCEDURE — G0279 TOMOSYNTHESIS, MAMMO: HCPCS

## 2024-04-23 ENCOUNTER — APPOINTMENT (OUTPATIENT)
Dept: SLEEP MEDICINE | Facility: MEDICAL CENTER | Age: 34
End: 2024-04-23
Attending: FAMILY MEDICINE
Payer: COMMERCIAL

## 2024-05-07 ENCOUNTER — APPOINTMENT (OUTPATIENT)
Dept: SLEEP MEDICINE | Facility: MEDICAL CENTER | Age: 34
End: 2024-05-07
Attending: FAMILY MEDICINE
Payer: COMMERCIAL

## 2024-05-07 VITALS — HEIGHT: 67 IN | WEIGHT: 174 LBS | BODY MASS INDEX: 27.31 KG/M2

## 2024-05-07 DIAGNOSIS — R05.3 CHRONIC COUGH: ICD-10-CM

## 2024-05-07 PROCEDURE — 94726 PLETHYSMOGRAPHY LUNG VOLUMES: CPT | Mod: 26 | Performed by: STUDENT IN AN ORGANIZED HEALTH CARE EDUCATION/TRAINING PROGRAM

## 2024-05-07 PROCEDURE — 94729 DIFFUSING CAPACITY: CPT | Mod: 26 | Performed by: STUDENT IN AN ORGANIZED HEALTH CARE EDUCATION/TRAINING PROGRAM

## 2024-05-07 PROCEDURE — 94060 EVALUATION OF WHEEZING: CPT | Mod: 26 | Performed by: STUDENT IN AN ORGANIZED HEALTH CARE EDUCATION/TRAINING PROGRAM

## 2024-05-07 ASSESSMENT — PULMONARY FUNCTION TESTS
FVC_PERCENT_PREDICTED: 130
FEV1_PERCENT_PREDICTED: 121
FVC_PREDICTED: 4.09
FVC: 5.36
FEV1/FVC: 75
FEV1_LLN: 2.83
FEV1/FVC_PERCENT_PREDICTED: 90
FVC: 5.34
FEV1/FVC_PERCENT_PREDICTED: 93
FVC_PERCENT_PREDICTED: 130
FEV1/FVC: 77
FEV1_PREDICTED: 3.38
FEV1_PERCENT_CHANGE: 2
FEV1/FVC_PERCENT_CHANGE: 2
FEV1_PERCENT_CHANGE: 0
FEV1: 4.13
FEV1/FVC: 77.05
FEV1/FVC_PERCENT_PREDICTED: 83
FEV1/FVC_PERCENT_PREDICTED: 92
FEV1/FVC_PREDICTED: 83
FEV1: 4.03
FEV1_PERCENT_PREDICTED: 119
FEV1/FVC: 75
FEV1/FVC_PERCENT_PREDICTED: 92
FEV1/FVC_PERCENT_LLN: 69
FVC_LLN: 3.42

## 2024-05-07 NOTE — PROCEDURES
Tech: Zamzam Courtney, MAMTA  Good patient effort & cooperation.  Test was performed on the [employer] Body Plethysmograph- Elite DX system.  The predicted sets used for Spirometry are GLI-2012, for Lung Volumes are ITS, and for DLCO is GLI 2017.  The results of this test meet the ATS standards for acceptability and repeatability.  The DLCO was uncorrected for Hb.  A bronchodilator of Ventolin HFA 2 puffs via spacer was administered.  DLCO was performed during dilation period.    Interpretation:    There is no significant obstruction or restriction. No significant bronchodilator response. Normal diffusion capacity.

## 2024-05-08 ENCOUNTER — APPOINTMENT (RX ONLY)
Dept: URBAN - METROPOLITAN AREA CLINIC 4 | Facility: CLINIC | Age: 34
Setting detail: DERMATOLOGY
End: 2024-05-08

## 2024-05-08 DIAGNOSIS — D22 MELANOCYTIC NEVI: ICD-10-CM

## 2024-05-08 DIAGNOSIS — Q828 OTHER SPECIFIED ANOMALIES OF SKIN: ICD-10-CM

## 2024-05-08 DIAGNOSIS — Q819 OTHER SPECIFIED ANOMALIES OF SKIN: ICD-10-CM

## 2024-05-08 DIAGNOSIS — Q826 OTHER SPECIFIED ANOMALIES OF SKIN: ICD-10-CM

## 2024-05-08 DIAGNOSIS — D485 NEOPLASM OF UNCERTAIN BEHAVIOR OF SKIN: ICD-10-CM

## 2024-05-08 PROBLEM — D48.5 NEOPLASM OF UNCERTAIN BEHAVIOR OF SKIN: Status: ACTIVE | Noted: 2024-05-08

## 2024-05-08 PROBLEM — D22.9 MELANOCYTIC NEVI, UNSPECIFIED: Status: ACTIVE | Noted: 2024-05-08

## 2024-05-08 PROBLEM — L85.8 OTHER SPECIFIED EPIDERMAL THICKENING: Status: ACTIVE | Noted: 2024-05-08

## 2024-05-08 PROCEDURE — 99203 OFFICE O/P NEW LOW 30 MIN: CPT | Mod: 25

## 2024-05-08 PROCEDURE — 11104 PUNCH BX SKIN SINGLE LESION: CPT

## 2024-05-08 PROCEDURE — ? COUNSELING

## 2024-05-08 PROCEDURE — ? BIOPSY BY PUNCH METHOD

## 2024-05-08 PROCEDURE — ? ADDITIONAL NOTES

## 2024-05-08 ASSESSMENT — LOCATION SIMPLE DESCRIPTION DERM
LOCATION SIMPLE: RIGHT BREAST
LOCATION SIMPLE: RIGHT UPPER ARM

## 2024-05-08 ASSESSMENT — LOCATION ZONE DERM
LOCATION ZONE: TRUNK
LOCATION ZONE: ARM

## 2024-05-08 ASSESSMENT — LOCATION DETAILED DESCRIPTION DERM
LOCATION DETAILED: RIGHT AREOLA
LOCATION DETAILED: RIGHT ANTERIOR PROXIMAL UPPER ARM

## 2024-05-08 NOTE — PROCEDURE: BIOPSY BY PUNCH METHOD
Detail Level: Detailed
Was A Bandage Applied: Yes
Punch Size In Mm: 4
Size Of Lesion In Cm (Optional): 0
Depth Of Punch Biopsy: dermis
Biopsy Type: H and E
Anesthesia Type: 1% lidocaine without epinephrine
Anesthesia Volume In Cc: 0.5
Hemostasis: None
Epidermal Sutures: 5-0 Chromic Gut
Wound Care: Petrolatum
Dressing: bandage
Patient Will Remove Sutures At Home?: No
Lab: [id number]
Lab Facility: 
Consent: Written consent was obtained and risks were reviewed including but not limited to scarring, infection, bleeding, scabbing, incomplete removal, nerve damage and allergy to anesthesia.
Post-Care Instructions: I reviewed with the patient in detail post-care instructions. Patient is to keep the biopsy site dry overnight, and then apply bacitracin twice daily until healed. Patient may apply hydrogen peroxide soaks to remove any crusting.
Home Suture Removal Text: Patient was provided a home suture removal kit and will remove their sutures at home.  If they have any questions or difficulties they will call the office.
Notification Instructions: Patient will be notified of biopsy results. However, patient instructed to call the office if not contacted within 2 weeks.
Billing Type: Third-Party Bill
Information: Selecting Yes will display possible errors in your note based on the variables you have selected. This validation is only offered as a suggestion for you. PLEASE NOTE THAT THE VALIDATION TEXT WILL BE REMOVED WHEN YOU FINALIZE YOUR NOTE. IF YOU WANT TO FAX A PRELIMINARY NOTE YOU WILL NEED TO TOGGLE THIS TO 'NO' IF YOU DO NOT WANT IT IN YOUR FAXED NOTE.

## 2024-05-08 NOTE — PROCEDURE: ADDITIONAL NOTES
Additional Notes: Recommended pt to use Cerave SA cleanser and CeraVe SA cream
Detail Level: Simple
Render Risk Assessment In Note?: no
Additional Notes: UBE completed today, would rec’d FBE in the next few months.

## 2024-09-10 ENCOUNTER — ANCILLARY PROCEDURE (OUTPATIENT)
Dept: MATERNAL FETAL MEDICINE | Facility: MEDICAL CENTER | Age: 34
End: 2024-09-10
Attending: OBSTETRICS & GYNECOLOGY
Payer: COMMERCIAL

## 2024-09-10 ENCOUNTER — OFFICE VISIT (OUTPATIENT)
Dept: MATERNAL FETAL MEDICINE | Facility: MEDICAL CENTER | Age: 34
End: 2024-09-10
Payer: COMMERCIAL

## 2024-09-10 VITALS
SYSTOLIC BLOOD PRESSURE: 120 MMHG | WEIGHT: 174 LBS | DIASTOLIC BLOOD PRESSURE: 74 MMHG | BODY MASS INDEX: 27.66 KG/M2 | HEART RATE: 62 BPM

## 2024-09-10 DIAGNOSIS — O99.281 HYPOTHYROIDISM AFFECTING PREGNANCY IN FIRST TRIMESTER: ICD-10-CM

## 2024-09-10 DIAGNOSIS — O09.529: ICD-10-CM

## 2024-09-10 DIAGNOSIS — E03.9 HYPOTHYROIDISM AFFECTING PREGNANCY IN FIRST TRIMESTER: ICD-10-CM

## 2024-09-10 DIAGNOSIS — Z3A.12 12 WEEKS GESTATION OF PREGNANCY: ICD-10-CM

## 2024-09-10 DIAGNOSIS — O09.521 ADVANCED MATERNAL AGE IN MULTIGRAVIDA, FIRST TRIMESTER: ICD-10-CM

## 2024-09-10 PROCEDURE — 99204 OFFICE O/P NEW MOD 45 MIN: CPT | Performed by: OBSTETRICS & GYNECOLOGY

## 2024-09-10 PROCEDURE — 76813 OB US NUCHAL MEAS 1 GEST: CPT | Performed by: OBSTETRICS & GYNECOLOGY

## 2024-09-10 PROCEDURE — 76801 OB US < 14 WKS SINGLE FETUS: CPT | Performed by: OBSTETRICS & GYNECOLOGY

## 2024-09-18 ENCOUNTER — TELEPHONE (OUTPATIENT)
Dept: MATERNAL FETAL MEDICINE | Facility: MEDICAL CENTER | Age: 34
End: 2024-09-18
Payer: COMMERCIAL

## 2024-09-30 ENCOUNTER — TELEPHONE (OUTPATIENT)
Dept: MATERNAL FETAL MEDICINE | Facility: MEDICAL CENTER | Age: 34
End: 2024-09-30

## 2024-09-30 NOTE — TELEPHONE ENCOUNTER
Called patient and let her know per Dr. Kaur he would like patient to have Free T4 and Free T3 done.  Also he would like patient to have Dr. Davis refer her to an Endocrinologist.  I will fax a copy of recent TSH labs to both Dr. Davis ob, and Jeny Santana pcp provider. Patient verbalized understanding agreed with plan of care and had no further questions.

## 2024-10-29 ENCOUNTER — ANCILLARY PROCEDURE (OUTPATIENT)
Dept: MATERNAL FETAL MEDICINE | Facility: MEDICAL CENTER | Age: 34
End: 2024-10-29
Payer: COMMERCIAL

## 2024-10-29 VITALS
WEIGHT: 175.8 LBS | DIASTOLIC BLOOD PRESSURE: 66 MMHG | HEART RATE: 68 BPM | SYSTOLIC BLOOD PRESSURE: 127 MMHG | BODY MASS INDEX: 27.95 KG/M2

## 2024-10-29 DIAGNOSIS — O09.529: ICD-10-CM

## 2024-10-29 DIAGNOSIS — O09.523 AMA (ADVANCED MATERNAL AGE) MULTIGRAVIDA 35+, THIRD TRIMESTER: ICD-10-CM

## 2024-10-29 DIAGNOSIS — O35.BXX0 FETAL CARDIAC ANOMALY COMPLICATING PREGNANCY, ANTEPARTUM, NOT APPLICABLE OR UNSPECIFIED FETUS: ICD-10-CM

## 2024-10-29 DIAGNOSIS — Z3A.19 19 WEEKS GESTATION OF PREGNANCY: ICD-10-CM

## 2025-02-04 ENCOUNTER — APPOINTMENT (OUTPATIENT)
Dept: MATERNAL FETAL MEDICINE | Facility: MEDICAL CENTER | Age: 35
End: 2025-02-04
Payer: COMMERCIAL

## 2025-02-04 VITALS
DIASTOLIC BLOOD PRESSURE: 72 MMHG | BODY MASS INDEX: 30.56 KG/M2 | SYSTOLIC BLOOD PRESSURE: 125 MMHG | HEART RATE: 77 BPM | WEIGHT: 192.2 LBS

## 2025-02-04 DIAGNOSIS — O09.529: ICD-10-CM

## 2025-02-04 DIAGNOSIS — O09.523 AMA (ADVANCED MATERNAL AGE) MULTIGRAVIDA 35+, THIRD TRIMESTER: ICD-10-CM

## 2025-02-04 DIAGNOSIS — Z3A.33 33 WEEKS GESTATION OF PREGNANCY: ICD-10-CM

## 2025-02-04 DIAGNOSIS — O35.BXX0 FETAL CARDIAC ANOMALY COMPLICATING PREGNANCY, ANTEPARTUM, NOT APPLICABLE OR UNSPECIFIED FETUS: ICD-10-CM

## 2025-02-04 PROCEDURE — 76816 OB US FOLLOW-UP PER FETUS: CPT | Performed by: OBSTETRICS & GYNECOLOGY

## 2025-03-21 ENCOUNTER — ANESTHESIA (OUTPATIENT)
Dept: ANESTHESIOLOGY | Facility: MEDICAL CENTER | Age: 35
End: 2025-03-21
Payer: COMMERCIAL

## 2025-03-21 ENCOUNTER — HOSPITAL ENCOUNTER (INPATIENT)
Facility: MEDICAL CENTER | Age: 35
LOS: 1 days | End: 2025-03-21
Attending: OBSTETRICS & GYNECOLOGY | Admitting: OBSTETRICS & GYNECOLOGY
Payer: COMMERCIAL

## 2025-03-21 ENCOUNTER — ANESTHESIA EVENT (OUTPATIENT)
Dept: ANESTHESIOLOGY | Facility: MEDICAL CENTER | Age: 35
End: 2025-03-21
Payer: COMMERCIAL

## 2025-03-21 DIAGNOSIS — R52 POSTPARTUM PAIN: ICD-10-CM

## 2025-03-21 LAB
BASOPHILS # BLD AUTO: 0.3 % (ref 0–1.8)
BASOPHILS # BLD: 0.03 K/UL (ref 0–0.12)
CRYSTALS AMN MICRO: NORMAL
EOSINOPHIL # BLD AUTO: 0.05 K/UL (ref 0–0.51)
EOSINOPHIL NFR BLD: 0.5 % (ref 0–6.9)
ERYTHROCYTE [DISTWIDTH] IN BLOOD BY AUTOMATED COUNT: 46.2 FL (ref 35.9–50)
HCT VFR BLD AUTO: 40.9 % (ref 37–47)
HGB BLD-MCNC: 13.8 G/DL (ref 12–16)
HOLDING TUBE BB 8507: NORMAL
IMM GRANULOCYTES # BLD AUTO: 0.05 K/UL (ref 0–0.11)
IMM GRANULOCYTES NFR BLD AUTO: 0.5 % (ref 0–0.9)
LYMPHOCYTES # BLD AUTO: 2.19 K/UL (ref 1–4.8)
LYMPHOCYTES NFR BLD: 21.1 % (ref 22–41)
MCH RBC QN AUTO: 30.7 PG (ref 27–33)
MCHC RBC AUTO-ENTMCNC: 33.7 G/DL (ref 32.2–35.5)
MCV RBC AUTO: 90.9 FL (ref 81.4–97.8)
MONOCYTES # BLD AUTO: 0.68 K/UL (ref 0–0.85)
MONOCYTES NFR BLD AUTO: 6.6 % (ref 0–13.4)
NEUTROPHILS # BLD AUTO: 7.37 K/UL (ref 1.82–7.42)
NEUTROPHILS NFR BLD: 71 % (ref 44–72)
NRBC # BLD AUTO: 0 K/UL
NRBC BLD-RTO: 0 /100 WBC (ref 0–0.2)
PLATELET # BLD AUTO: 197 K/UL (ref 164–446)
PMV BLD AUTO: 9.9 FL (ref 9–12.9)
RBC # BLD AUTO: 4.5 M/UL (ref 4.2–5.4)
T PALLIDUM AB SER QL IA: NORMAL
WBC # BLD AUTO: 10.4 K/UL (ref 4.8–10.8)

## 2025-03-21 PROCEDURE — A9270 NON-COVERED ITEM OR SERVICE: HCPCS | Performed by: OBSTETRICS & GYNECOLOGY

## 2025-03-21 PROCEDURE — 700111 HCHG RX REV CODE 636 W/ 250 OVERRIDE (IP): Performed by: STUDENT IN AN ORGANIZED HEALTH CARE EDUCATION/TRAINING PROGRAM

## 2025-03-21 PROCEDURE — 89060 EXAM SYNOVIAL FLUID CRYSTALS: CPT

## 2025-03-21 PROCEDURE — 85025 COMPLETE CBC W/AUTO DIFF WBC: CPT

## 2025-03-21 PROCEDURE — 700105 HCHG RX REV CODE 258: Performed by: STUDENT IN AN ORGANIZED HEALTH CARE EDUCATION/TRAINING PROGRAM

## 2025-03-21 PROCEDURE — 304965 HCHG RECOVERY SERVICES

## 2025-03-21 PROCEDURE — 303615 HCHG EPIDURAL/SPINAL ANESTHESIA FOR LABOR

## 2025-03-21 PROCEDURE — 770002 HCHG ROOM/CARE - OB PRIVATE (112)

## 2025-03-21 PROCEDURE — 3E0234Z INTRODUCTION OF SERUM, TOXOID AND VACCINE INTO MUSCLE, PERCUTANEOUS APPROACH: ICD-10-PCS | Performed by: OBSTETRICS & GYNECOLOGY

## 2025-03-21 PROCEDURE — 700111 HCHG RX REV CODE 636 W/ 250 OVERRIDE (IP): Performed by: OBSTETRICS & GYNECOLOGY

## 2025-03-21 PROCEDURE — 36415 COLL VENOUS BLD VENIPUNCTURE: CPT

## 2025-03-21 PROCEDURE — 700101 HCHG RX REV CODE 250: Performed by: STUDENT IN AN ORGANIZED HEALTH CARE EDUCATION/TRAINING PROGRAM

## 2025-03-21 PROCEDURE — 59409 OBSTETRICAL CARE: CPT

## 2025-03-21 PROCEDURE — 302449 STATCHG TRIAGE ONLY (STATISTIC)

## 2025-03-21 PROCEDURE — 86780 TREPONEMA PALLIDUM: CPT

## 2025-03-21 PROCEDURE — 85027 COMPLETE CBC AUTOMATED: CPT

## 2025-03-21 PROCEDURE — 700102 HCHG RX REV CODE 250 W/ 637 OVERRIDE(OP): Performed by: OBSTETRICS & GYNECOLOGY

## 2025-03-21 PROCEDURE — 700105 HCHG RX REV CODE 258: Performed by: OBSTETRICS & GYNECOLOGY

## 2025-03-21 RX ORDER — SODIUM CHLORIDE, SODIUM LACTATE, POTASSIUM CHLORIDE, CALCIUM CHLORIDE 600; 310; 30; 20 MG/100ML; MG/100ML; MG/100ML; MG/100ML
2000 INJECTION, SOLUTION INTRAVENOUS PRN
Status: DISCONTINUED | OUTPATIENT
Start: 2025-03-21 | End: 2025-03-22 | Stop reason: HOSPADM

## 2025-03-21 RX ORDER — LEVOTHYROXINE SODIUM 50 UG/1
50 TABLET ORAL
Status: DISCONTINUED | OUTPATIENT
Start: 2025-03-21 | End: 2025-03-21

## 2025-03-21 RX ORDER — MISOPROSTOL 200 UG/1
800 TABLET ORAL
Status: DISCONTINUED | OUTPATIENT
Start: 2025-03-21 | End: 2025-03-22 | Stop reason: HOSPADM

## 2025-03-21 RX ORDER — IBUPROFEN 800 MG/1
800 TABLET, FILM COATED ORAL
Status: DISCONTINUED | OUTPATIENT
Start: 2025-03-21 | End: 2025-03-21 | Stop reason: HOSPADM

## 2025-03-21 RX ORDER — ONDANSETRON 2 MG/ML
4 INJECTION INTRAMUSCULAR; INTRAVENOUS EVERY 6 HOURS PRN
Status: DISCONTINUED | OUTPATIENT
Start: 2025-03-21 | End: 2025-03-21 | Stop reason: HOSPADM

## 2025-03-21 RX ORDER — VITAMIN A ACETATE, BETA CAROTENE, ASCORBIC ACID, CHOLECALCIFEROL, .ALPHA.-TOCOPHEROL ACETATE, DL-, THIAMINE MONONITRATE, RIBOFLAVIN, NIACINAMIDE, PYRIDOXINE HYDROCHLORIDE, FOLIC ACID, CYANOCOBALAMIN, CALCIUM CARBONATE, FERROUS FUMARATE, ZINC OXIDE, CUPRIC OXIDE 3080; 12; 120; 400; 1; 1.84; 3; 20; 22; 920; 25; 200; 27; 10; 2 [IU]/1; UG/1; MG/1; [IU]/1; MG/1; MG/1; MG/1; MG/1; MG/1; [IU]/1; MG/1; MG/1; MG/1; MG/1; MG/1
1 TABLET, FILM COATED ORAL
Status: DISCONTINUED | OUTPATIENT
Start: 2025-03-21 | End: 2025-03-22 | Stop reason: HOSPADM

## 2025-03-21 RX ORDER — SODIUM CHLORIDE, SODIUM LACTATE, POTASSIUM CHLORIDE, AND CALCIUM CHLORIDE .6; .31; .03; .02 G/100ML; G/100ML; G/100ML; G/100ML
1000 INJECTION, SOLUTION INTRAVENOUS
Status: COMPLETED | OUTPATIENT
Start: 2025-03-21 | End: 2025-03-21

## 2025-03-21 RX ORDER — OXYTOCIN 10 [USP'U]/ML
10 INJECTION, SOLUTION INTRAMUSCULAR; INTRAVENOUS
Status: DISCONTINUED | OUTPATIENT
Start: 2025-03-21 | End: 2025-03-21 | Stop reason: HOSPADM

## 2025-03-21 RX ORDER — SIMETHICONE 125 MG
125 TABLET,CHEWABLE ORAL 4 TIMES DAILY PRN
Status: DISCONTINUED | OUTPATIENT
Start: 2025-03-21 | End: 2025-03-22 | Stop reason: HOSPADM

## 2025-03-21 RX ORDER — ONDANSETRON 4 MG/1
4 TABLET, ORALLY DISINTEGRATING ORAL EVERY 6 HOURS PRN
Status: DISCONTINUED | OUTPATIENT
Start: 2025-03-21 | End: 2025-03-21 | Stop reason: HOSPADM

## 2025-03-21 RX ORDER — DOCUSATE SODIUM 100 MG/1
100 CAPSULE, LIQUID FILLED ORAL 2 TIMES DAILY PRN
Status: DISCONTINUED | OUTPATIENT
Start: 2025-03-21 | End: 2025-03-22 | Stop reason: HOSPADM

## 2025-03-21 RX ORDER — ACETAMINOPHEN 500 MG
1000 TABLET ORAL
Status: DISCONTINUED | OUTPATIENT
Start: 2025-03-21 | End: 2025-03-21 | Stop reason: HOSPADM

## 2025-03-21 RX ORDER — LEVOTHYROXINE SODIUM 50 UG/1
50 TABLET ORAL
COMMUNITY

## 2025-03-21 RX ORDER — BISACODYL 10 MG
10 SUPPOSITORY, RECTAL RECTAL PRN
Status: DISCONTINUED | OUTPATIENT
Start: 2025-03-21 | End: 2025-03-22 | Stop reason: HOSPADM

## 2025-03-21 RX ORDER — LEVOTHYROXINE SODIUM 50 UG/1
50 TABLET ORAL
Status: DISCONTINUED | OUTPATIENT
Start: 2025-03-21 | End: 2025-03-22 | Stop reason: HOSPADM

## 2025-03-21 RX ORDER — BUPIVACAINE HYDROCHLORIDE 2.5 MG/ML
INJECTION, SOLUTION EPIDURAL; INFILTRATION; INTRACAUDAL; PERINEURAL
Status: COMPLETED
Start: 2025-03-21 | End: 2025-03-21

## 2025-03-21 RX ORDER — METHYLERGONOVINE MALEATE 0.2 MG/ML
0.2 INJECTION INTRAVENOUS
Status: DISCONTINUED | OUTPATIENT
Start: 2025-03-21 | End: 2025-03-22 | Stop reason: HOSPADM

## 2025-03-21 RX ORDER — CALCIUM CARBONATE 500 MG/1
1000 TABLET, CHEWABLE ORAL EVERY 6 HOURS PRN
Status: DISCONTINUED | OUTPATIENT
Start: 2025-03-21 | End: 2025-03-22 | Stop reason: HOSPADM

## 2025-03-21 RX ORDER — ROPIVACAINE HYDROCHLORIDE 2 MG/ML
INJECTION, SOLUTION EPIDURAL; INFILTRATION; PERINEURAL CONTINUOUS
Status: DISCONTINUED | OUTPATIENT
Start: 2025-03-21 | End: 2025-03-21

## 2025-03-21 RX ORDER — BUPIVACAINE HYDROCHLORIDE 2.5 MG/ML
INJECTION, SOLUTION EPIDURAL; INFILTRATION; INTRACAUDAL; PERINEURAL
Status: COMPLETED | OUTPATIENT
Start: 2025-03-21 | End: 2025-03-21

## 2025-03-21 RX ORDER — SODIUM CHLORIDE, SODIUM LACTATE, POTASSIUM CHLORIDE, CALCIUM CHLORIDE 600; 310; 30; 20 MG/100ML; MG/100ML; MG/100ML; MG/100ML
INJECTION, SOLUTION INTRAVENOUS CONTINUOUS
Status: DISCONTINUED | OUTPATIENT
Start: 2025-03-21 | End: 2025-03-21

## 2025-03-21 RX ORDER — LIDOCAINE HYDROCHLORIDE AND EPINEPHRINE 15; 5 MG/ML; UG/ML
INJECTION, SOLUTION EPIDURAL
Status: COMPLETED | OUTPATIENT
Start: 2025-03-21 | End: 2025-03-21

## 2025-03-21 RX ORDER — IBUPROFEN 800 MG/1
800 TABLET, FILM COATED ORAL EVERY 8 HOURS PRN
Status: DISCONTINUED | OUTPATIENT
Start: 2025-03-21 | End: 2025-03-22 | Stop reason: HOSPADM

## 2025-03-21 RX ORDER — TERBUTALINE SULFATE 1 MG/ML
0.25 INJECTION SUBCUTANEOUS
Status: DISCONTINUED | OUTPATIENT
Start: 2025-03-21 | End: 2025-03-21 | Stop reason: HOSPADM

## 2025-03-21 RX ORDER — ACETAMINOPHEN 500 MG
1000 TABLET ORAL EVERY 6 HOURS PRN
Status: DISCONTINUED | OUTPATIENT
Start: 2025-03-21 | End: 2025-03-22 | Stop reason: HOSPADM

## 2025-03-21 RX ORDER — OXYCODONE HYDROCHLORIDE 5 MG/1
5 TABLET ORAL EVERY 4 HOURS PRN
Status: DISCONTINUED | OUTPATIENT
Start: 2025-03-21 | End: 2025-03-22 | Stop reason: HOSPADM

## 2025-03-21 RX ORDER — LIDOCAINE HYDROCHLORIDE 10 MG/ML
20 INJECTION, SOLUTION INFILTRATION; PERINEURAL
Status: DISCONTINUED | OUTPATIENT
Start: 2025-03-21 | End: 2025-03-21 | Stop reason: HOSPADM

## 2025-03-21 RX ORDER — CARBOPROST TROMETHAMINE 250 UG/ML
250 INJECTION, SOLUTION INTRAMUSCULAR
Status: DISCONTINUED | OUTPATIENT
Start: 2025-03-21 | End: 2025-03-22 | Stop reason: HOSPADM

## 2025-03-21 RX ORDER — SODIUM CHLORIDE, SODIUM LACTATE, POTASSIUM CHLORIDE, AND CALCIUM CHLORIDE .6; .31; .03; .02 G/100ML; G/100ML; G/100ML; G/100ML
250 INJECTION, SOLUTION INTRAVENOUS PRN
Status: DISCONTINUED | OUTPATIENT
Start: 2025-03-21 | End: 2025-03-21

## 2025-03-21 RX ORDER — EPHEDRINE SULFATE 50 MG/ML
5 INJECTION, SOLUTION INTRAVENOUS
Status: DISCONTINUED | OUTPATIENT
Start: 2025-03-21 | End: 2025-03-21

## 2025-03-21 RX ADMIN — ROPIVACAINE HYDROCHLORIDE: 2 INJECTION, SOLUTION EPIDURAL; INFILTRATION; PERINEURAL at 03:50

## 2025-03-21 RX ADMIN — LEVOTHYROXINE SODIUM 50 MCG: 0.05 TABLET ORAL at 09:30

## 2025-03-21 RX ADMIN — SODIUM CHLORIDE, POTASSIUM CHLORIDE, SODIUM LACTATE AND CALCIUM CHLORIDE: 600; 310; 30; 20 INJECTION, SOLUTION INTRAVENOUS at 04:42

## 2025-03-21 RX ADMIN — OXYTOCIN 125 ML/HR: 10 INJECTION, SOLUTION INTRAMUSCULAR; INTRAVENOUS at 09:02

## 2025-03-21 RX ADMIN — BUPIVACAINE HYDROCHLORIDE 8 ML: 2.5 INJECTION, SOLUTION EPIDURAL; INFILTRATION; INTRACAUDAL at 06:06

## 2025-03-21 RX ADMIN — PRENATAL WITH FERROUS FUM AND FOLIC ACID 1 TABLET: 3080; 920; 120; 400; 22; 1.84; 3; 20; 10; 1; 12; 200; 27; 25; 2 TABLET ORAL at 14:07

## 2025-03-21 RX ADMIN — BUPIVACAINE HYDROCHLORIDE 5 ML: 2.5 INJECTION, SOLUTION EPIDURAL; INFILTRATION; INTRACAUDAL at 03:42

## 2025-03-21 RX ADMIN — SODIUM CHLORIDE, POTASSIUM CHLORIDE, SODIUM LACTATE AND CALCIUM CHLORIDE 1000 ML: 600; 310; 30; 20 INJECTION, SOLUTION INTRAVENOUS at 03:35

## 2025-03-21 RX ADMIN — ACETAMINOPHEN 1000 MG: 500 TABLET ORAL at 17:04

## 2025-03-21 RX ADMIN — IBUPROFEN 800 MG: 800 TABLET, FILM COATED ORAL at 17:04

## 2025-03-21 RX ADMIN — OXYTOCIN 10 UNITS: 10 INJECTION, SOLUTION INTRAMUSCULAR; INTRAVENOUS at 07:41

## 2025-03-21 RX ADMIN — OXYTOCIN 125 ML/HR: 10 INJECTION, SOLUTION INTRAMUSCULAR; INTRAVENOUS at 12:50

## 2025-03-21 RX ADMIN — OXYTOCIN 10 UNITS: 10 INJECTION, SOLUTION INTRAMUSCULAR; INTRAVENOUS at 08:49

## 2025-03-21 RX ADMIN — LIDOCAINE HYDROCHLORIDE,EPINEPHRINE BITARTRATE 3 ML: 15; .005 INJECTION, SOLUTION EPIDURAL; INFILTRATION; INTRACAUDAL; PERINEURAL at 03:50

## 2025-03-21 ASSESSMENT — PAIN DESCRIPTION - PAIN TYPE
TYPE: ACUTE PAIN

## 2025-03-21 NOTE — PROGRESS NOTES
"1300- Assumed care from labor and delivery. Oriented patient to room, call light, emergency light, bed remote. Assessment completed. Fundus firm, lochia light. Plan of care reviewed. Denies pain at this time, will call if pain med intervention needed. Call light within reach, bed at lowest position, and shows no signs of distress at this time.     1500- Patient ambulates to restroom with a steady gait and tolerates well. Patient stated she voided \"a lot\" and felt her bladder empty.  "

## 2025-03-21 NOTE — PROGRESS NOTES
0400 - Report received from ARCADIO Asencio. POC discussed.     0338 - Dr. Tyler at bedside for epidural placement.     0350 - Test dose for epidural placed without complications.     0428 - Report given to Dr. Vasquez regarding updated SVE.     0540 - Dr. Vasquez at bedside.     0554 - Dr. Tyler called to bedside for epidural bolus.     0620 - Pt reporting pain relief from epidural following bolus.     0652 - Report called to Dr. Vasquez regarding updated SVE. Okay to start pushing.     0657 - Pushing initiated.     0700 - Report given to Terri DYSON RN. POC discussed.

## 2025-03-21 NOTE — L&D DELIVERY NOTE
DATE OF SERVICE:  2025     DELIVERY NOTE     This is a 34-year-old  2, para 1, who presented to labor and delivery   last night at 39 weeks with an estimated date of confinement of 2025   with complaints of spontaneous rupture of membranes for clear fluid on   2025 at 2345 hours.  Sterile speculum exam was positive x3.  She was 5-6   cm dilated.  She was admitted to labor and delivery and received an epidural   for pain management.  Throughout labor, fetal heart tones were in the 130s and   a category 1 tracing.  She progressed to complete and pushed for   approximately 40 minutes to deliver the head over an intact perineum.  Mouth   and nose were bulb suctioned.  There was no evidence of a nuchal cord.  The   rest of baby delivered easily with the next push from the occiput anterior   position.  There was noted to be a compound presentation with the left hand.    This is a viable male infant, weight 8 pounds 8.5 ounces, Apgars 8 and 9, delivered at   0736 hours.  The infant was placed on the mother's abdomen crying vigorously.    There was delayed cord clamping for 1 minute.  The cord was then clamped   twice and cut.  The placenta delivered spontaneously intact with a 3-vessel   cord at 0742 hours.  A 20 units of IV Pitocin, fundal massage and bimanual   massage provided good uterine contraction.  Estimated blood loss was 200 mL.   Inspection of the cervix revealed no lacerations.  Inspection of the perineum   revealed a small perineal separation.  This was repaired with 3-0 Vicryl in   the usual sterile fashion.  Mother and infant are stable.        ______________________________  MD VICTOR HUGO MCGARRY/GALLO    DD:  2025 08:04  DT:  2025 09:33    Job#:  [id number]    CC:GEOVANY MAGANA MD

## 2025-03-21 NOTE — PROGRESS NOTES
0700 Bedside report received from irena Bunn. Patient pushing in semi-fowlers position, epidural working and dense per patient    0730 Dr. Vasquez to bedside    0736  delivery of male infant at 0736, APGARS 8/9    0845 Fundus displaced to maternal R, unable to ambulate to bathroom at this time, straight cath performed, bleeding now light

## 2025-03-21 NOTE — ANESTHESIA PROCEDURE NOTES
Epidural Block    Date/Time: 3/21/2025 3:42 AM    Performed by: Tiburcio Tyler MD, PhD  Authorized by: Tiburcio Tyler MD, PhD    Patient Location:  OB  Start Time:  3/21/2025 3:42 AM  End Time:  3/21/2025 3:50 AM  Reason for Block: labor analgesia    patient identified, IV checked, site marked, risks and benefits discussed, surgical consent, monitors and equipment checked, pre-op evaluation and timeout performed    Patient Position:  Sitting  Prep: ChloraPrep, patient draped and sterile technique    Monitoring:  Blood pressure, continuous pulse oximetry and heart rate  Approach:  Midline  Location:  L3-L4  Injection Technique:  IVAN saline  Skin infiltration:  Lidocaine  Strength:  1%  Dose:  3ml  Needle Type:  Tuohy  Needle Gauge:  17 G  Needle Length:  3.5 in  Loss of resistance::  6  Catheter Size:  19 G  Catheter at Skin Depth:  12  Test Dose Result:  Negative

## 2025-03-21 NOTE — PROGRESS NOTES
1020- Bedside report received from America SONI RN, care assumed at this time. Patient denies pain and is sitting up in bed. Patient encouraged to use call light for assistance.     1230- Report given to Terri VELÁSQUEZ RN. Care relinquished at this time.

## 2025-03-21 NOTE — PROGRESS NOTES
S: Pt requested and received epidural, but still having left sided pain with contractions.      O: VSS - afebrile  FHTs 130s cat 1  Oco Q 2  Cx: 8/100/-1 per RN  Frn positive    A/P:  IUP at 39 weeks, SROM, active labor - fair  Labor:  Progressing well. Anesthesia to re- bolus epidural  FWB: reassuring

## 2025-03-21 NOTE — ANESTHESIA PREPROCEDURE EVALUATION
Date: 25  Procedure: Labor Epidural       33 yo F  requesting labor epidural    Relevant Problems   No relevant active problems       Physical Exam    Airway   Mallampati: II  TM distance: >3 FB  Neck ROM: full       Cardiovascular - normal exam  Rhythm: regular  Rate: normal  (-) murmur     Dental - normal exam           Pulmonary - normal exam  Breath sounds clear to auscultation     Abdominal    Neurological - normal exam                   Anesthesia Plan    ASA 2       Plan - epidural   Neuraxial block will be labor analgesia                  Pertinent diagnostic labs and testing reviewed    Informed Consent:    Anesthetic plan and risks discussed with patient.

## 2025-03-21 NOTE — ANESTHESIA POSTPROCEDURE EVALUATION
Patient: Katja Edwards    Procedure Summary       Date: 03/21/25 Room / Location:     Anesthesia Start: 0342 Anesthesia Stop: 0736    Procedure: Labor Epidural Diagnosis:     Scheduled Providers:  Responsible Provider: Lloyd Hein M.D.    Anesthesia Type: epidural ASA Status: 2            Final Anesthesia Type: epidural  Last vitals  BP   Blood Pressure: 116/72    Temp   36.4 °C (97.5 °F)    Pulse   80   Resp   18    SpO2   92 %      Anesthesia Post Evaluation    Patient location during evaluation: bedside  Patient participation: complete - patient participated  Level of consciousness: awake and alert    Airway patency: patent  Anesthetic complications: no  Cardiovascular status: hemodynamically stable  Respiratory status: acceptable  Hydration status: euvolemic    PONV: none          No notable events documented.     Nurse Pain Score: 0 (NPRS)

## 2025-03-21 NOTE — CARE PLAN
The patient is Stable - Low risk of patient condition declining or worsening         Progress made toward(s) clinical / shift goals:    Problem: Knowledge Deficit - L&D  Goal: Patient and family/caregivers will demonstrate understanding of plan of care, disease process/condition, diagnostic tests and medications  Outcome: Progressing   Pt continually updated and educated on ongoing POC   Problem: Pain  Goal: Patient's pain will be alleviated or reduced to the patient’s comfort goal  Outcome: Progressing   Pt states she is experiencing adequate pain control

## 2025-03-21 NOTE — CARE PLAN
Problem: Knowledge Deficit - L&D  Goal: Patient and family/caregivers will demonstrate understanding of plan of care, disease process/condition, diagnostic tests and medications  Outcome: Progressing  Note: Plan of care discussed with patient and family at bedside, all questions answered at this time     Problem: Pain  Goal: Patient's pain will be alleviated or reduced to the patient’s comfort goal  Outcome: Progressing  Flowsheets  Taken 3/21/2025 0800  Pain Rating Scale (NPRS): 0  Taken 3/21/2025 0700  OB Pain Level: 0-No Pain  OB Pain Intervention: Epidural  Note: Well working epidural in plave in second stage of labor, infusion off now and pain still controlled, will continue to assess per protocol   The patient is Stable - Low risk of patient condition declining or worsening    Shift Goals  Clinical Goals: Assess PP bleeding and fundus  Patient Goals: Breastfeeding assistance  Family Goals: Support    Progress made toward(s) clinical / shift goals:  Progressing    Patient is not progressing towards the following goals:

## 2025-03-21 NOTE — CARE PLAN
The patient is Stable - Low risk of patient condition declining or worsening    Shift Goals  Clinical Goals: Pain control, Vitals signs WDL, Lochia WDL  Patient Goals:   Family Goals:  Progress made toward(s) clinical / shift goals:    Problem: Altered Physiologic Condition  Goal: Patient physiologically stable as evidenced by normal lochia, palpable uterine involution and vitals within normal limits  Outcome: Progressing  Note: Patient's fundus firm, at umbilicus with light bleeding through out this shift.     Problem: Psychosocial - Postpartum  Goal: Patient will verbalize and demonstrate effective bonding and parenting behavior  Outcome: Progressing  Note: Patient demonstrates bonding with infant  through out this shift.

## 2025-03-21 NOTE — H&P
DATE OF ADMISSION:  2025     ADMITTING DIAGNOSES:    1.  Intrauterine pregnancy at 39 weeks.  2.  Spontaneous rupture of membranes for clear fluid at 2345 hours on   2025.  3.  Advanced maternal age.  4.  Labor.  5.  Fetal VSD.  6.  Hypothyroidism.  7.  Group B strep negative.     HISTORY OF PRESENT ILLNESS:  This is a 34-year-old  2, para 1, with an   estimated date of confinement of 2025 established by last menstrual   period consistent with a 9-week ultrasound, who presents to labor and delivery   with complaints of spontaneous rupture of membranes for clear fluid last   night at 2345 hours.  Sterile speculum exam showed fluid in her vagina.  Fern   was collected and is pending.  However, she is 5-6 cm dilated, 80% effaced, -1   station, vertex presentation.  The recommendation is made for admission.  The   patient agrees and is requesting an epidural.     Prenatal care has been with Dr. Davis.  Her estimated date of confinement   of 2025 was established by last menstrual period consistent with a   9-week ultrasound.     PRENATAL LABS:  Her blood type is O positive, antibody screen negative, RPR   nonreactive, rubella immune, hepatitis B surface antigen negative, hepatitis C   negative, HIV negative.  Her 1-hour Glucola was normal.  Her group B strep   status is negative.  Her noninvasive  screening and AFP were low   risk.     Complications with the pregnancy include advanced maternal age.  She has been   followed by [facility]. She has an anterior placenta   with no previa.  She also has hypothyroidism and levels have been checked   throughout her pregnancy.  There is a fetal VSD.     ALLERGIES:  SHE IS ALLERGIC TO PENICILLIN AND KEFLEX.     MEDICATIONS:  Include prenatal vitamins and levothyroxine 50 mcg p.o. daily.     PAST MEDICAL HISTORY:  Significant for hypothyroidism.     PAST SURGICAL HISTORY:  Negative.     SOCIAL HISTORY:  She denies  tobacco, alcohol or IV drug use.     FAMILY HISTORY:  She has no family history of GYN or colon cancer.     GYNECOLOGIC HISTORY:  She has no history of any HSV or abnormal Paps.     OBSTETRICAL HISTORY:   1 was a normal spontaneous vaginal delivery, 7   pounds 7 ounces, no complications.   2 is her current pregnancy.     PHYSICAL EXAMINATION:    VITAL SIGNS:  Temperature is 97.2, pulse is 80, blood pressure is 126/78.  GENERAL:  She is in moderate distress with contractions.  ABDOMEN:  Gravid and nontender.  EXTREMITIES:  Show +1 pedal edema.  She has no cords or Homans sign.  PELVIC:  Fetal heart tones are in the 140s-150s and a category 1 tracing.    Tocometry shows contractions every 2-3 minutes.  Cervical exam per the nurse   on admission, she is 5-6 cm dilated, 80% effaced, -1 station, vertex   presentation.  Estimated fetal weight is 3400 grams.     IMPRESSION:  This is a 34-year-old  2, para 1, at 39 weeks with   complaints of spontaneous rupture of membranes for clear fluid at 2345 hours   on 2025, who is in active labor and doing well.     PLAN:    1.  The patient will be admitted to labor and delivery.  2.  IV fluids will be started.  3.  Epidural will be placed.  4.  There is currently reassuring fetal surveillance.  5.  The baby will need a  echo.        ______________________________  MD VICTOR HUGO MCGARRY/OLIVE    DD:  2025 02:50  DT:  2025 03:15    Job#:  [id number]    CC:GEOVANY MAGANA MD

## 2025-03-21 NOTE — ANESTHESIA TIME REPORT
Anesthesia Start and Stop Event Times       Date Time Event    3/21/2025 0340 Ready for Procedure     0342 Anesthesia Start     0736 Anesthesia Stop          Responsible Staff  03/21/25      Name Role Begin End    Tiburcio Tyler MD, PhD Anesth [id number]    Lloyd Hein M.D. Anesth [phone]          Overtime Reason:  no overtime (within assigned shift)    Comments:

## 2025-03-21 NOTE — PROGRESS NOTES
1240-Patient transferred to postpartum in stable condition via wheelchair with infant in arms. Report given to Shamika ERVIN, POC discussed.

## 2025-03-22 VITALS
OXYGEN SATURATION: 95 % | WEIGHT: 192 LBS | SYSTOLIC BLOOD PRESSURE: 102 MMHG | BODY MASS INDEX: 35.33 KG/M2 | RESPIRATION RATE: 17 BRPM | DIASTOLIC BLOOD PRESSURE: 75 MMHG | HEART RATE: 70 BPM | TEMPERATURE: 98.2 F | HEIGHT: 62 IN

## 2025-03-22 PROBLEM — E03.9 HYPOTHYROIDISM: Status: ACTIVE | Noted: 2025-03-22

## 2025-03-22 LAB
ERYTHROCYTE [DISTWIDTH] IN BLOOD BY AUTOMATED COUNT: 49.1 FL (ref 35.9–50)
HCT VFR BLD AUTO: 35.4 % (ref 37–47)
HGB BLD-MCNC: 11.7 G/DL (ref 12–16)
MCH RBC QN AUTO: 30.9 PG (ref 27–33)
MCHC RBC AUTO-ENTMCNC: 33.1 G/DL (ref 32.2–35.5)
MCV RBC AUTO: 93.4 FL (ref 81.4–97.8)
PLATELET # BLD AUTO: 179 K/UL (ref 164–446)
PMV BLD AUTO: 10 FL (ref 9–12.9)
RBC # BLD AUTO: 3.79 M/UL (ref 4.2–5.4)
WBC # BLD AUTO: 17 K/UL (ref 4.8–10.8)

## 2025-03-22 PROCEDURE — 700102 HCHG RX REV CODE 250 W/ 637 OVERRIDE(OP): Performed by: OBSTETRICS & GYNECOLOGY

## 2025-03-22 PROCEDURE — 85027 COMPLETE CBC AUTOMATED: CPT

## 2025-03-22 PROCEDURE — A9270 NON-COVERED ITEM OR SERVICE: HCPCS | Performed by: OBSTETRICS & GYNECOLOGY

## 2025-03-22 PROCEDURE — 36415 COLL VENOUS BLD VENIPUNCTURE: CPT

## 2025-03-22 RX ORDER — IBUPROFEN 200 MG
200-600 TABLET ORAL EVERY 6 HOURS PRN
COMMUNITY
Start: 2025-03-22

## 2025-03-22 RX ORDER — ACETAMINOPHEN 500 MG
500-1000 TABLET ORAL EVERY 6 HOURS PRN
COMMUNITY
Start: 2025-03-22

## 2025-03-22 RX ADMIN — LEVOTHYROXINE SODIUM 50 MCG: 0.05 TABLET ORAL at 05:41

## 2025-03-22 RX ADMIN — PRENATAL WITH FERROUS FUM AND FOLIC ACID 1 TABLET: 3080; 920; 120; 400; 22; 1.84; 3; 20; 10; 1; 12; 200; 27; 25; 2 TABLET ORAL at 08:45

## 2025-03-22 ASSESSMENT — EDINBURGH POSTNATAL DEPRESSION SCALE (EPDS)
I HAVE BEEN SO UNHAPPY THAT I HAVE BEEN CRYING: ONLY OCCASIONALLY
I HAVE LOOKED FORWARD WITH ENJOYMENT TO THINGS: RATHER LESS THAN I USED TO
I HAVE BEEN ABLE TO LAUGH AND SEE THE FUNNY SIDE OF THINGS: AS MUCH AS I ALWAYS COULD
THINGS HAVE BEEN GETTING ON TOP OF ME: YES, SOMETIMES I HAVEN'T BEEN COPING AS WELL AS USUAL
I HAVE BLAMED MYSELF UNNECESSARILY WHEN THINGS WENT WRONG: YES, SOME OF THE TIME
I HAVE BEEN ANXIOUS OR WORRIED FOR NO GOOD REASON: YES, SOMETIMES
I HAVE FELT SCARED OR PANICKY FOR NO GOOD REASON: NO, NOT AT ALL
I HAVE BEEN SO UNHAPPY THAT I HAVE HAD DIFFICULTY SLEEPING: NOT AT ALL
I HAVE FELT SAD OR MISERABLE: NO, NOT AT ALL
THE THOUGHT OF HARMING MYSELF HAS OCCURRED TO ME: NEVER

## 2025-03-22 ASSESSMENT — PAIN DESCRIPTION - PAIN TYPE
TYPE: ACUTE PAIN
TYPE: ACUTE PAIN

## 2025-03-22 NOTE — PROGRESS NOTES
POSTPARTUM DAY 1      No complaints.  Patient is doing well with infant care and lactation issues.  Patient is voiding well.    PE:    Afebrile  BP normal    Uterus involuting appropriately, nontender  Perineum:  No perineal complaints, so I didn't do specific perineal exam.  Calves nontender, Carolyn negative bilaterally.     LAB:       25 03:05 25 00:33   WBC 10.4 17.0 (H)   Hemoglobin 13.8 11.7 (L)   Hematocrit 40.9 35.4 (L)   Platelet Count 197 179        PLAN:  Postpartum care.  Lactation consult.  Patient desires discharge:  today  .    Prescriptions:   PNV, levothyroxine 50 mcg daily, OTC analgesics PRN. .  Followup plans:   6 wks .

## 2025-03-22 NOTE — DISCHARGE INSTRUCTIONS
PATIENT DISCHARGE EDUCATION INSTRUCTION SHEET    REASONS TO CALL YOUR OBSTETRICIAN  Persistent fever, shaking, chills (Temperature higher than 100.4) may indicate you have an infection  Heavy bleeding: soaking more than 1 pad per hour; Passing clots an egg-sized clot or bigger may mean you have an postpartum hemorrhage  Foul odor from vagina or bad smelling or discolored discharge or blood  Breast infection (Mastitis symptoms); breast pain, chills, fever, redness or red streaks, may feel flu like symptoms  Urinary pain, burning or frequency  Redness, swelling, warmth, or painful to touch in the calf area of your leg may mean you have a blood clot  Severe or intensified depression, thoughts or feelings of wanting to hurt yourself or someone else   Pain in chest, obstructed breathing or shortness of breath (trouble catching your breath) may mean you are having a postpartum complication. Call your provider immediately   Headache that does not get better, even after taking medicine, a bad headache with vision changes or pain in the upper right area of your belly may mean you have high blood pressure or post birth preeclampsia. Call your provider immediately    HAND WASHING  All family and friends should wash their hands:  Before and after holding the baby  Before feeding the baby  After using the restroom or changing the baby's diaper    WOUND CARE  Ask your physician for additional care instructions. In general:  Episiotomy/Laceration  May use parveen-spray bottle, witch hazel pads and dermaplast spray for comfort  Use parveen-spray bottle after urinating to cleanse perineal area  To prevent burning during urination spray parveen-water bottle on labial area   Pat perineal area dry until episiotomy/laceration is healed  Continue to use parveen-bottle until bleeding stops as needed  If have a 2nd degree laceration or greater, a Sitz bath can offer relief from soreness, burning, and inflammation   Sitz Bath   Sit in 6 inches of warm  "water and soak laceration as needed until the laceration heals    VAGINAL CARE AND BLEEDING  Nothing inside vagina for 6 weeks:   No sexual intercourse, tampons or douching  Bleeding may continue for 2-4 weeks. Amount and color may vary  Soaking 1 pad or more in an hour for several hours is considered heavy bleeding  Passing large egg sized blood clots can be concerning  If you feel like you have heavy bleeding or are having increasing amount of blood clots call your Obstetrician immediately  If you begin feeling faint upon standing, feeling sick to your stomach, have clammy skin, a really fast heartbeat, have chills, start feeling confused, dizzy, sleepy or weak, or feeling like you're going to faint call your Obstetrician immediately    HYPERTENSION   Preeclampsia or gestational hypertension are types of high blood pressure that only pregnant women can get. It is important for you to be aware of symptoms to seek early intervention and treatment. If you have any of these symptoms immediately call your Obstetrician    Vision changes or blurred vision   Severe headache or pain that is unrelieved with medication and will not go away  Persistent pain in upper abdomen or shoulder   Increased swelling of face, feet, or hands  Difficulty breathing or shortness of breath at rest  Urinating less than usual    URINATION AND BOWEL MOVEMENTS  Eating more fiber (bran cereal, fruits, and vegetables) and drinking plenty of fluids will help to avoid constipation  Urinary frequency and urgency after childbirth is normal  If you experience any urinary pain, burning or frequency call your provider    BIRTH CONTROL  It is possible to become pregnant at any time after delivery and while breastfeeding  Plan to discuss a method of birth control with your physician at your post delivery follow up visit    POSTPARTUM BLUES  During the first few days after birth, you may experience a sense of the \"blues\" which may include impatience, " "irritability or even crying. These feelings come and go quickly. However, as many as 1 in 10 women experience emotional symptoms known as postpartum depression.     POSTPARTUM DEPRESSION    May start as early as the second or third day after delivery or take several weeks or months to develop. Symptoms of \"blues\" are present, but are more intense: Crying spells; loss of appetite; feelings of hopelessness or loss of control; fear of touching the baby; over concern or no concern at all about the baby; little or no concern about your own appearance/caring for yourself; and/or inability to sleep or excessive sleeping. Contact your Obstetrician if you are experiencing any of these symptoms     PREVENTING SHAKEN BABY  If you are angry or stressed, PUT THE BABY IN THE CRIB, step away, take some deep breaths, and wait until you are calm to care for the baby. DO NOT SHAKE THE BABY. You are not alone, call a supporter for help.  [facility] 24/7 crisis call line (974-899-5597) or (1-192.903.9833)  You can also text them, text \"ANSWER\" (101411)  "

## 2025-03-22 NOTE — CARE PLAN
The patient is Stable - Low risk of patient condition declining or worsening    Shift Goals  Clinical Goals: fundus firm; lochia WDL; pain management  Patient Goals: healthy baby  Family Goals: Support    Progress made toward(s) clinical / shift goals:    Problem: Knowledge Deficit - Postpartum  Goal: Patient will verbalize and demonstrate understanding of self and infant care  Outcome: Progressing     Problem: Infection - Postpartum  Goal: Postpartum patient will be free of signs and symptoms of infection  Outcome: Progressing       Patient is not progressing towards the following goals:

## 2025-03-22 NOTE — LACTATION NOTE
This note was copied from a baby's chart.  I observed Katja and baby Torres breastfeeding this morning and was able to offer some tips on maternal and infant positioning, as well as improving the latch angle to assure more comfort. Her technique was good but baby was fussy and gassy. Feeding cues discussed.Anticipatory guidance provided to in regards to normal and expected  feeding patterns and behaviors.    Katja reports having a challenging prior lactation experience. She feels her milk production was low, 16-18 ounces daily. She did attend consultations as an out-patient.    Her plan is to supplement with formula as needed this time.We reviewed resources and she was encouraged to follow up.I advised parents to download the [facility] for new parent baby care and breast feeding video education.

## 2025-03-22 NOTE — CARE PLAN
The patient is Stable - Low risk of patient condition declining or worsening    Shift Goals  Clinical Goals: Pain control, Vitals signs WDL, Lochia WDL  Patient Goals:   Family Goals:     Progress made toward(s) clinical / shift goals:    Problem: Psychosocial - Postpartum  Goal: Patient will verbalize and demonstrate effective bonding and parenting behavior  Outcome: Progressing  Note: Patient demonstrates adequate bonding with infant  through out this shift.

## 2025-03-22 NOTE — PROGRESS NOTES
0815- Assessment completed. Fundus firm, lochia light. Plan of care reviewed. Denies pain at this time, will call if pain med intervention needed. Call light within reach, bed at lowest position, and shows no signs of distress at this time.    1430- Discharge education and follow up information reviewed who verbalizes understanding and papers were signed. Prescribed meds and information provided, patient verbalized understanding.     1445- Left facility escorted by staff.

## 2025-03-31 ENCOUNTER — TELEPHONE (OUTPATIENT)
Dept: OBGYN | Facility: CLINIC | Age: 35
End: 2025-03-31
Payer: COMMERCIAL